# Patient Record
Sex: FEMALE | Race: WHITE | NOT HISPANIC OR LATINO | Employment: PART TIME | URBAN - METROPOLITAN AREA
[De-identification: names, ages, dates, MRNs, and addresses within clinical notes are randomized per-mention and may not be internally consistent; named-entity substitution may affect disease eponyms.]

---

## 2017-01-13 ENCOUNTER — TELEPHONE (OUTPATIENT)
Dept: FAMILY MEDICINE | Facility: OTHER | Age: 57
End: 2017-01-13

## 2017-01-13 NOTE — TELEPHONE ENCOUNTER
Patient declined setting up a appt, she has no insurance at this time. She thinks she will be getting it again soon.

## 2017-02-17 ENCOUNTER — TELEPHONE (OUTPATIENT)
Dept: FAMILY MEDICINE | Facility: OTHER | Age: 57
End: 2017-02-17

## 2017-02-17 NOTE — TELEPHONE ENCOUNTER
Summary:    Patient is due/failing the following:   COLONOSCOPY and MAMMOGRAM  Please inform patient of the [facility] program   Action needed:   Schedule a colonoscopy and a mammogram     Type of outreach:    Phone, left message for patient to call back.     Questions for provider review:    None                                                                                                                                    Ambika Yen       Chart routed to Care Team .      Panel Management Review      Patient has the following on her problem list: None      Composite cancer screening  Chart review shows that this patient is due/due soon for the following Mammogram and Colonoscopy

## 2017-02-17 NOTE — LETTER
[facility]  [address]   [facility] [address]  Phone: [phone]  February 24, 2017      July Green  [address]   [place] [facility] [place] [address]      Dear July,    We care about your health and have reviewed your health plan including your medical conditions, medications, and lab results.  Based on this review, it is recommended that you follow up regarding the following health topic(s):  -Breast Cancer Screening  -Colon Cancer Screening    We recommend you take the following action(s):  -schedule a MAMMOGRAM which is due. Please disregard this reminder if you have had this exam elsewhere within the last 1-2 years please let us know so we can update your records.  -schedule a COLONOSCOPY to look for colon cancer (due every 10 years or 5 years in higher risk situations.)  Colonoscopies can prevent 90-95% of colon cancer deaths.  Problem lesions can be removed before they ever become cancer.  If you do not wish to do a colonoscopy or cannot afford to do one at this time, there is another option called a Fecal Immunochemical Occult Blood Test (FIT) a take home stool sample kit.  It does not replace the colonoscopy for colorectal cancer screening, but it can detect hidden bleeding in the lower colon.  It does need to be repeated every year and if a positive result is obtained, you would be referred for a colonoscopy.  If you have completed either one of these tests at another facility, please have the records sent to our clinic for our records.     Please call us at the [facility] - [phone] (or use [employer]) to address the above recommendations.     Thank you for trusting [facility] and we appreciate the opportunity to serve you.  We look forward to supporting your healthcare needs in the future.    Healthy Regards,    Your Health Care Team  [facility]

## 2017-03-30 ENCOUNTER — TELEPHONE (OUTPATIENT)
Dept: UROLOGY | Facility: OTHER | Age: 57
End: 2017-03-30

## 2017-04-11 NOTE — TELEPHONE ENCOUNTER
Per Dr. Tirado, patient needs cystocele repair with mesh and pubovaginal sling.  Latonia Griffith, CMA

## 2017-04-11 NOTE — TELEPHONE ENCOUNTER
Patient advised of surgical date/time/place. Surgical packet mailed with detailed instructions.  Latonia Griffith CMA

## 2017-04-11 NOTE — TELEPHONE ENCOUNTER
Surgery scheduled at [facility] on 5/4/2017 at 1:00pm. Arrive at 12:00noon. Patient need to arrange H & P with primary, stay NPO after midnight night before, stop OTC blood thinners one week prior, and arrange a  for same day surgery. I left a message for patient to call.  Precert sent to managed care.  Latonia Griffith CMA
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
## 2017-04-20 NOTE — PATIENT INSTRUCTIONS
Before Your Surgery      Call your surgeon if there is any change in your health. This includes signs of a cold or flu (such as a sore throat, runny nose, cough, rash or fever).    Do not smoke, drink alcohol or take over the counter medicine (unless your surgeon or primary care doctor tells you to) for the 24 hours before and after surgery.    If you take prescribed drugs: Follow your doctor s orders about which medicines to take and which to stop until after surgery. Avoid aspirin, ibuprofen, and Aleve until after surgery.    Eating and drinking prior to surgery: follow the instructions from your surgeon    Take a shower or bath the night before surgery. Use the soap your surgeon gave you to gently clean your skin. If you do not have soap from your surgeon, use your regular soap. Do not shave or scrub the surgery site.  Wear clean pajamas and have clean sheets on your bed.     Will start you on Lexapro to take daily to help with the night sweats.  Call me in 1 month to let me know how things are going and we can always increase the dose.      The chest pain is due to a muscle/cartilage strain. Avoid excessive bending and twisting and heavy lifting and use Tylenol/ibuprofen, ice/heat as needed. If symptoms are not improving, let me know.    Schedule your mammogram and colonoscopy.       Chest Wall Pain: Costochondritis    The chest pain that you have had today is caused by costochondritis. This condition is caused by an inflammation of the cartilage joining your ribs to your breastbone. It is not caused by heart or lung problems. The inflammation may have been brought on by a blow to the chest, lifting heavy objects, intense exercise, or an illness that made you cough and sneeze. It often occurs during times of emotional stress. It can be painful, but it is not dangerous. It usually goes away in 1 to 2 weeks. But it may happen again. Rarely, a more serious condition may cause symptoms similar to costochondritis.  That s why it s important to watch for the warning signs listed below.  Home care  Follow these guidelines when caring for yourself at home:    If you feel that emotional stress is a cause of your condition, try to figure out the sources of that stress. It may not be obvious! Learn ways to deal with the stress in your life. This can include regular exercise, muscle relaxation, meditation, or simply taking time out for yourself. For more information about this, talk with your health care provider. Or go to your local library and look at books on  stress reduction.     You may use acetaminophen or ibuprofen to control pain, unless another pain medicine was prescribed. If you have liver disease or ever had a stomach ulcer, talk with your health care provider before using these medicines.    You can also help ease pain by using a hot, wet compress or heating pad. Use this with or without a medicated skin cream that helps relieves pain.    Do stretching exercise as advised by your provider.    Take any prescribed medicines as directed.  Follow-up care  Follow up with your health care provider, or as advised, if you do not start to get better in the next 2 days.  When to seek medical advice  Call your health care provider right away if any of these occur:    A change in the type of pain. Call if it feels different, becomes more serious, lasts longer, or spreads into your shoulder, arm, neck, jaw, or back.    Shortness of breath or pain gets worse when you breathe    Weakness, dizziness, or fainting    Cough with dark-colored sputum (phlegm) or blood    Abdominal pain    Dark red or black stools    Fever of 100.4 F (38 C) or higher, or as directed by your health care provider    6937-1578 [employer] [address] [place] [address], [place] [address]. All rights reserved. This information is not intended as a substitute for professional medical care. Always follow your healthcare professional's instructions.

## 2017-04-26 ENCOUNTER — OFFICE VISIT (OUTPATIENT)
Dept: FAMILY MEDICINE | Facility: OTHER | Age: 57
End: 2017-04-26
Payer: COMMERCIAL

## 2017-04-26 VITALS
TEMPERATURE: 97.3 F | RESPIRATION RATE: 20 BRPM | SYSTOLIC BLOOD PRESSURE: 122 MMHG | HEART RATE: 78 BPM | BODY MASS INDEX: 27.66 KG/M2 | OXYGEN SATURATION: 96 % | HEIGHT: 64 IN | DIASTOLIC BLOOD PRESSURE: 80 MMHG | WEIGHT: 162 LBS

## 2017-04-26 DIAGNOSIS — N95.1 MENOPAUSAL SYNDROME (HOT FLASHES): ICD-10-CM

## 2017-04-26 DIAGNOSIS — R25.2 CRAMP OF LIMB: ICD-10-CM

## 2017-04-26 DIAGNOSIS — Z01.810 PRE-OPERATIVE CARDIOVASCULAR EXAMINATION: Primary | ICD-10-CM

## 2017-04-26 DIAGNOSIS — R07.89 COSTOCHONDRAL CHEST PAIN: ICD-10-CM

## 2017-04-26 DIAGNOSIS — Z01.818 PREOP GENERAL PHYSICAL EXAM: ICD-10-CM

## 2017-04-26 DIAGNOSIS — N81.10 BLADDER PROLAPSE, FEMALE, ACQUIRED: ICD-10-CM

## 2017-04-26 LAB
ANION GAP SERPL CALCULATED.3IONS-SCNC: 7 MMOL/L (ref 3–14)
BUN SERPL-MCNC: 10 MG/DL (ref 7–30)
CALCIUM SERPL-MCNC: 9.1 MG/DL (ref 8.5–10.1)
CHLORIDE SERPL-SCNC: 106 MMOL/L (ref 94–109)
CO2 SERPL-SCNC: 30 MMOL/L (ref 20–32)
CREAT SERPL-MCNC: 0.7 MG/DL (ref 0.52–1.04)
GFR SERPL CREATININE-BSD FRML MDRD: 86 ML/MIN/1.7M2
GLUCOSE SERPL-MCNC: 95 MG/DL (ref 70–99)
MAGNESIUM SERPL-MCNC: 2.4 MG/DL (ref 1.6–2.3)
POTASSIUM SERPL-SCNC: 4.1 MMOL/L (ref 3.4–5.3)
SODIUM SERPL-SCNC: 143 MMOL/L (ref 133–144)

## 2017-04-26 PROCEDURE — 83735 ASSAY OF MAGNESIUM: CPT | Performed by: PHYSICIAN ASSISTANT

## 2017-04-26 PROCEDURE — 36415 COLL VENOUS BLD VENIPUNCTURE: CPT | Performed by: PHYSICIAN ASSISTANT

## 2017-04-26 PROCEDURE — 80048 BASIC METABOLIC PNL TOTAL CA: CPT | Performed by: PHYSICIAN ASSISTANT

## 2017-04-26 PROCEDURE — 93000 ELECTROCARDIOGRAM COMPLETE: CPT | Performed by: PHYSICIAN ASSISTANT

## 2017-04-26 PROCEDURE — 99214 OFFICE O/P EST MOD 30 MIN: CPT | Performed by: PHYSICIAN ASSISTANT

## 2017-04-26 RX ORDER — ESCITALOPRAM OXALATE 10 MG/1
10 TABLET ORAL DAILY
Qty: 30 TABLET | Refills: 1 | Status: SHIPPED | OUTPATIENT
Start: 2017-04-26 | End: 2017-07-18

## 2017-04-26 NOTE — NURSING NOTE
"Chief Complaint   Patient presents with     Pre-Op Exam     dos- 5/4/17- [facility]     Panel Management     mammogram, colon screen, honoring choices        Initial /80 (BP Location: Right arm, Patient Position: Chair, Cuff Size: Adult Regular)  Pulse 78  Temp 97.3  F (36.3  C) (Temporal)  Resp 20  Ht 5' 4.25\" (1.632 m)  Wt 162 lb (73.5 kg)  LMP 05/12/2011  SpO2 96%  BMI 27.59 kg/m2 Estimated body mass index is 27.59 kg/(m^2) as calculated from the following:    Height as of this encounter: 5' 4.25\" (1.632 m).    Weight as of this encounter: 162 lb (73.5 kg).  Medication Reconciliation: complete     Cynthia Ramos, BRIDGETTE      "

## 2017-04-26 NOTE — MR AVS SNAPSHOT
After Visit Summary   4/26/2017    July Green    MRN: [id number]           Patient Information     Date Of Birth          1960        Visit Information        Provider Department      4/26/2017 7:30 AM Allan Peña PA-C [facility]        Today's Diagnoses     Pre-operative cardiovascular examination    -  1    Preop general physical exam        Bladder prolapse, female, acquired        Costochondral chest pain        Cramp of limb        Menopausal syndrome (hot flashes)          Care Instructions      Before Your Surgery      Call your surgeon if there is any change in your health. This includes signs of a cold or flu (such as a sore throat, runny nose, cough, rash or fever).    Do not smoke, drink alcohol or take over the counter medicine (unless your surgeon or primary care doctor tells you to) for the 24 hours before and after surgery.    If you take prescribed drugs: Follow your doctor s orders about which medicines to take and which to stop until after surgery. Avoid aspirin, ibuprofen, and Aleve until after surgery.    Eating and drinking prior to surgery: follow the instructions from your surgeon    Take a shower or bath the night before surgery. Use the soap your surgeon gave you to gently clean your skin. If you do not have soap from your surgeon, use your regular soap. Do not shave or scrub the surgery site.  Wear clean pajamas and have clean sheets on your bed.     Will start you on Lexapro to take daily to help with the night sweats.  Call me in 1 month to let me know how things are going and we can always increase the dose.      The chest pain is due to a muscle/cartilage strain. Avoid excessive bending and twisting and heavy lifting and use Tylenol/ibuprofen, ice/heat as needed. If symptoms are not improving, let me know.    Schedule your mammogram and colonoscopy.       Chest Wall Pain: Costochondritis    The chest pain that you have had today is  caused by costochondritis. This condition is caused by an inflammation of the cartilage joining your ribs to your breastbone. It is not caused by heart or lung problems. The inflammation may have been brought on by a blow to the chest, lifting heavy objects, intense exercise, or an illness that made you cough and sneeze. It often occurs during times of emotional stress. It can be painful, but it is not dangerous. It usually goes away in 1 to 2 weeks. But it may happen again. Rarely, a more serious condition may cause symptoms similar to costochondritis. That s why it s important to watch for the warning signs listed below.  Home care  Follow these guidelines when caring for yourself at home:    If you feel that emotional stress is a cause of your condition, try to figure out the sources of that stress. It may not be obvious! Learn ways to deal with the stress in your life. This can include regular exercise, muscle relaxation, meditation, or simply taking time out for yourself. For more information about this, talk with your health care provider. Or go to your local library and look at books on  stress reduction.     You may use acetaminophen or ibuprofen to control pain, unless another pain medicine was prescribed. If you have liver disease or ever had a stomach ulcer, talk with your health care provider before using these medicines.    You can also help ease pain by using a hot, wet compress or heating pad. Use this with or without a medicated skin cream that helps relieves pain.    Do stretching exercise as advised by your provider.    Take any prescribed medicines as directed.  Follow-up care  Follow up with your health care provider, or as advised, if you do not start to get better in the next 2 days.  When to seek medical advice  Call your health care provider right away if any of these occur:    A change in the type of pain. Call if it feels different, becomes more serious, lasts longer, or spreads into your  "shoulder, arm, neck, jaw, or back.    Shortness of breath or pain gets worse when you breathe    Weakness, dizziness, or fainting    Cough with dark-colored sputum (phlegm) or blood    Abdominal pain    Dark red or black stools    Fever of 100.4 F (38 C) or higher, or as directed by your health care provider    0956-6275 [employer]. [address] [place] [address]. All rights reserved. This information is not intended as a substitute for professional medical care. Always follow your healthcare professional's instructions.              Follow-ups after your visit        Your next 10 appointments already scheduled     May 04, 2017   Procedure with Yayo Tirado MD   [facility] [place] [facility] (--)    [address]  [facility] [address] [phone]              Who to contact     If you have questions or need follow up information about today's clinic visit or your schedule please contact [facility] directly at [phone].  Normal or non-critical lab and imaging results will be communicated to you by [employer], letter or phone within 4 business days after the clinic has received the results. If you do not hear from us within 7 days, please contact the clinic through MyChart or phone. If you have a critical or abnormal lab result, we will notify you by phone as soon as possible.  Submit refill requests through [employer] or call your pharmacy and they will forward the refill request to us. Please allow 3 business days for your refill to be completed.          Additional Information About Your Visit        [employer] Information     [employer] lets you send messages to your doctor, view your test results, renew your prescriptions, schedule appointments and more. To sign up, go to [email or web address] . Click on \"Log in\" on the left side of the screen, which will take you to the Welcome page. Then click on \"Sign up Now\" on the right side of the page.     You will be " "asked to enter the access code listed below, as well as some personal information. Please follow the directions to create your username and password.     Your access code is: [email or web address]  Expires: 2017  8:09 AM     Your access code will  in 90 days. If you need help or a new code, please call your [facility] clinic or [phone].        Care EveryWhere ID     This is your Care EveryWhere ID. This could be used by other organizations to access your [facility] medical records  [id number]        Your Vitals Were     Pulse Temperature Respirations Height Last Period Pulse Oximetry    78 97.3  F (36.3  C) (Temporal) 20 5' 4.25\" (1.632 m) 2011 96%    BMI (Body Mass Index)                   27.59 kg/m2            Blood Pressure from Last 3 Encounters:   17 122/80   16 138/68   10/26/16 100/60    Weight from Last 3 Encounters:   17 162 lb (73.5 kg)   10/26/16 162 lb (73.5 kg)   10/14/16 162 lb (73.5 kg)              We Performed the Following     Basic metabolic panel  (Ca, Cl, CO2, Creat, Gluc, K, Na, BUN)     EKG 12-lead complete w/read - Clinics     Magnesium          Today's Medication Changes          These changes are accurate as of: 17  8:09 AM.  If you have any questions, ask your nurse or doctor.               Start taking these medicines.        Dose/Directions    escitalopram 10 MG tablet   Commonly known as:  LEXAPRO   Used for:  Menopausal syndrome (hot flashes)   Started by:  Allan Peña PA-C        Dose:  10 mg   Take 1 tablet (10 mg) by mouth daily   Quantity:  30 tablet   Refills:  1         Stop taking these medicines if you haven't already. Please contact your care team if you have questions.     venlafaxine 37.5 MG 24 hr capsule   Commonly known as:  EFFEXOR-XR   Stopped by:  Allan Peña PA-C                Where to get your medicines      These medications [place] [facility] [place] [address]  [address], [facility] [address]     Phone:  [phone]     escitalopram 10 MG tablet                Primary Care Provider Office Phone #    [facility] [phone]       No address on file        Thank you!     Thank you for choosing [facility]  for your care. Our goal is always to provide you with excellent care. Hearing back from our patients is one way we can continue to improve our services. Please take a few minutes to complete the written survey that you may receive in the mail after your visit with us. Thank you!             Your Updated Medication List - Protect others around you: Learn how to safely use, store and throw away your medicines at www.disposemymeds.org.          This list is accurate as of: 4/26/17  8:09 AM.  Always use your most recent med list.                   Brand Name Dispense Instructions for use    escitalopram 10 MG tablet    LEXAPRO    30 tablet    Take 1 tablet (10 mg) by mouth daily       MULTIVITAMIN ADULT PO          * triamcinolone 0.5 % cream    KENALOG    30 g    Apply sparingly to affected area three times daily.       * triamcinolone 0.1 % cream    KENALOG    80 g    Apply sparingly to affected area three times daily for 14 days at a time.       * Notice:  This list has 2 medication(s) that are the same as other medications prescribed for you. Read the directions carefully, and ask your doctor or other care provider to review them with you.

## 2017-05-03 ENCOUNTER — ANESTHESIA EVENT (OUTPATIENT)
Dept: SURGERY | Facility: AMBULATORY SURGERY CENTER | Age: 57
End: 2017-05-03

## 2017-05-04 ENCOUNTER — ANESTHESIA (OUTPATIENT)
Dept: SURGERY | Facility: AMBULATORY SURGERY CENTER | Age: 57
End: 2017-05-04

## 2017-05-04 ENCOUNTER — HOSPITAL ENCOUNTER (OUTPATIENT)
Facility: AMBULATORY SURGERY CENTER | Age: 57
Discharge: HOME | End: 2017-05-04
Attending: UROLOGY | Admitting: UROLOGY
Payer: COMMERCIAL

## 2017-05-04 VITALS
OXYGEN SATURATION: 94 % | SYSTOLIC BLOOD PRESSURE: 121 MMHG | RESPIRATION RATE: 16 BRPM | DIASTOLIC BLOOD PRESSURE: 72 MMHG | TEMPERATURE: 97.2 F

## 2017-05-04 DIAGNOSIS — Z98.890 POST-OPERATIVE STATE: Primary | ICD-10-CM

## 2017-05-04 PROCEDURE — 57282 COLPOPEXY EXTRAPERITONEAL: CPT | Performed by: UROLOGY

## 2017-05-04 PROCEDURE — 57267 INSERT MESH/PELVIC FLR ADDON: CPT | Mod: XS

## 2017-05-04 PROCEDURE — G8918 PT W/O PREOP ORDER IV AB PRO: HCPCS

## 2017-05-04 PROCEDURE — 57267 INSERT MESH/PELVIC FLR ADDON: CPT | Performed by: UROLOGY

## 2017-05-04 PROCEDURE — 57240 ANTERIOR COLPORRHAPHY: CPT | Performed by: UROLOGY

## 2017-05-04 PROCEDURE — 57240 ANTERIOR COLPORRHAPHY: CPT

## 2017-05-04 PROCEDURE — 57288 REPAIR BLADDER DEFECT: CPT

## 2017-05-04 PROCEDURE — 57282 COLPOPEXY EXTRAPERITONEAL: CPT

## 2017-05-04 PROCEDURE — G8907 PT DOC NO EVENTS ON DISCHARG: HCPCS

## 2017-05-04 PROCEDURE — 57288 REPAIR BLADDER DEFECT: CPT | Performed by: UROLOGY

## 2017-05-04 DEVICE — MESH SLING OBTRYX-HALO M0068505000: Type: IMPLANTABLE DEVICE | Site: VAGINA | Status: FUNCTIONAL

## 2017-05-04 DEVICE — IMPLANTABLE DEVICE: Type: IMPLANTABLE DEVICE | Site: VAGINA | Status: FUNCTIONAL

## 2017-05-04 RX ORDER — ONDANSETRON 4 MG/1
4 TABLET, ORALLY DISINTEGRATING ORAL EVERY 30 MIN PRN
Status: DISCONTINUED | OUTPATIENT
Start: 2017-05-04 | End: 2017-05-05 | Stop reason: HOSPADM

## 2017-05-04 RX ORDER — NALOXONE HYDROCHLORIDE 0.4 MG/ML
.1-.4 INJECTION, SOLUTION INTRAMUSCULAR; INTRAVENOUS; SUBCUTANEOUS
Status: DISCONTINUED | OUTPATIENT
Start: 2017-05-04 | End: 2017-05-05 | Stop reason: HOSPADM

## 2017-05-04 RX ORDER — TRAMADOL HYDROCHLORIDE 50 MG/1
50 TABLET ORAL EVERY 6 HOURS PRN
Qty: 30 TABLET | Refills: 0 | Status: SHIPPED | OUTPATIENT
Start: 2017-05-04 | End: 2018-05-08

## 2017-05-04 RX ORDER — BUPIVACAINE HYDROCHLORIDE 2.5 MG/ML
INJECTION, SOLUTION INFILTRATION; PERINEURAL PRN
Status: DISCONTINUED | OUTPATIENT
Start: 2017-05-04 | End: 2017-05-04 | Stop reason: HOSPADM

## 2017-05-04 RX ORDER — LIDOCAINE 40 MG/G
CREAM TOPICAL
Status: DISCONTINUED | OUTPATIENT
Start: 2017-05-04 | End: 2017-05-05 | Stop reason: HOSPADM

## 2017-05-04 RX ORDER — FENTANYL CITRATE 50 UG/ML
25-50 INJECTION, SOLUTION INTRAMUSCULAR; INTRAVENOUS
Status: DISCONTINUED | OUTPATIENT
Start: 2017-05-04 | End: 2017-05-05 | Stop reason: HOSPADM

## 2017-05-04 RX ORDER — PROPOFOL 10 MG/ML
INJECTION, EMULSION INTRAVENOUS PRN
Status: DISCONTINUED | OUTPATIENT
Start: 2017-05-04 | End: 2017-05-04

## 2017-05-04 RX ORDER — ACETAMINOPHEN 325 MG/1
975 TABLET ORAL ONCE
Status: COMPLETED | OUTPATIENT
Start: 2017-05-04 | End: 2017-05-04

## 2017-05-04 RX ORDER — CEPHALEXIN 500 MG/1
500 CAPSULE ORAL 2 TIMES DAILY
Qty: 4 CAPSULE | Refills: 0 | Status: SHIPPED | OUTPATIENT
Start: 2017-05-04 | End: 2017-05-06

## 2017-05-04 RX ORDER — CEFAZOLIN SODIUM 2 G/100ML
2 INJECTION, SOLUTION INTRAVENOUS
Status: COMPLETED | OUTPATIENT
Start: 2017-05-04 | End: 2017-05-04

## 2017-05-04 RX ORDER — LIDOCAINE HYDROCHLORIDE 20 MG/ML
INJECTION, SOLUTION INFILTRATION; PERINEURAL PRN
Status: DISCONTINUED | OUTPATIENT
Start: 2017-05-04 | End: 2017-05-04

## 2017-05-04 RX ORDER — ONDANSETRON 2 MG/ML
4 INJECTION INTRAMUSCULAR; INTRAVENOUS EVERY 30 MIN PRN
Status: DISCONTINUED | OUTPATIENT
Start: 2017-05-04 | End: 2017-05-05 | Stop reason: HOSPADM

## 2017-05-04 RX ORDER — KETOROLAC TROMETHAMINE 30 MG/ML
30 INJECTION, SOLUTION INTRAMUSCULAR; INTRAVENOUS EVERY 6 HOURS PRN
Status: DISCONTINUED | OUTPATIENT
Start: 2017-05-04 | End: 2017-05-05 | Stop reason: HOSPADM

## 2017-05-04 RX ORDER — PROPOFOL 10 MG/ML
INJECTION, EMULSION INTRAVENOUS CONTINUOUS PRN
Status: DISCONTINUED | OUTPATIENT
Start: 2017-05-04 | End: 2017-05-04

## 2017-05-04 RX ORDER — OXYCODONE HYDROCHLORIDE 5 MG/1
5 TABLET ORAL ONCE
Status: DISCONTINUED | OUTPATIENT
Start: 2017-05-04 | End: 2017-05-05 | Stop reason: HOSPADM

## 2017-05-04 RX ORDER — ONDANSETRON 2 MG/ML
INJECTION INTRAMUSCULAR; INTRAVENOUS PRN
Status: DISCONTINUED | OUTPATIENT
Start: 2017-05-04 | End: 2017-05-04

## 2017-05-04 RX ORDER — SODIUM CHLORIDE, SODIUM LACTATE, POTASSIUM CHLORIDE, CALCIUM CHLORIDE 600; 310; 30; 20 MG/100ML; MG/100ML; MG/100ML; MG/100ML
INJECTION, SOLUTION INTRAVENOUS CONTINUOUS
Status: DISCONTINUED | OUTPATIENT
Start: 2017-05-04 | End: 2017-05-05 | Stop reason: HOSPADM

## 2017-05-04 RX ORDER — DEXAMETHASONE SODIUM PHOSPHATE 4 MG/ML
INJECTION, SOLUTION INTRA-ARTICULAR; INTRALESIONAL; INTRAMUSCULAR; INTRAVENOUS; SOFT TISSUE PRN
Status: DISCONTINUED | OUTPATIENT
Start: 2017-05-04 | End: 2017-05-04

## 2017-05-04 RX ORDER — FENTANYL CITRATE 50 UG/ML
INJECTION, SOLUTION INTRAMUSCULAR; INTRAVENOUS PRN
Status: DISCONTINUED | OUTPATIENT
Start: 2017-05-04 | End: 2017-05-04

## 2017-05-04 RX ORDER — MEPERIDINE HYDROCHLORIDE 25 MG/ML
12.5 INJECTION INTRAMUSCULAR; INTRAVENOUS; SUBCUTANEOUS
Status: DISCONTINUED | OUTPATIENT
Start: 2017-05-04 | End: 2017-05-05 | Stop reason: HOSPADM

## 2017-05-04 RX ADMIN — ONDANSETRON 4 MG: 2 INJECTION INTRAMUSCULAR; INTRAVENOUS at 13:06

## 2017-05-04 RX ADMIN — SODIUM CHLORIDE, SODIUM LACTATE, POTASSIUM CHLORIDE, CALCIUM CHLORIDE: 600; 310; 30; 20 INJECTION, SOLUTION INTRAVENOUS at 11:22

## 2017-05-04 RX ADMIN — FENTANYL CITRATE 25 MCG: 50 INJECTION, SOLUTION INTRAMUSCULAR; INTRAVENOUS at 13:39

## 2017-05-04 RX ADMIN — CEFAZOLIN SODIUM 2 G: 2 INJECTION, SOLUTION INTRAVENOUS at 12:03

## 2017-05-04 RX ADMIN — FENTANYL CITRATE 25 MCG: 50 INJECTION, SOLUTION INTRAMUSCULAR; INTRAVENOUS at 11:57

## 2017-05-04 RX ADMIN — LIDOCAINE HYDROCHLORIDE 60 MG: 20 INJECTION, SOLUTION INFILTRATION; PERINEURAL at 11:57

## 2017-05-04 RX ADMIN — FENTANYL CITRATE 25 MCG: 50 INJECTION, SOLUTION INTRAMUSCULAR; INTRAVENOUS at 13:43

## 2017-05-04 RX ADMIN — ACETAMINOPHEN 975 MG: 325 TABLET ORAL at 11:17

## 2017-05-04 RX ADMIN — SODIUM CHLORIDE, SODIUM LACTATE, POTASSIUM CHLORIDE, CALCIUM CHLORIDE: 600; 310; 30; 20 INJECTION, SOLUTION INTRAVENOUS at 11:54

## 2017-05-04 RX ADMIN — PROPOFOL 200 MG: 10 INJECTION, EMULSION INTRAVENOUS at 11:57

## 2017-05-04 RX ADMIN — SODIUM CHLORIDE, SODIUM LACTATE, POTASSIUM CHLORIDE, CALCIUM CHLORIDE: 600; 310; 30; 20 INJECTION, SOLUTION INTRAVENOUS at 13:07

## 2017-05-04 RX ADMIN — KETOROLAC TROMETHAMINE 30 MG: 30 INJECTION, SOLUTION INTRAMUSCULAR; INTRAVENOUS at 13:32

## 2017-05-04 RX ADMIN — PROPOFOL 175 MCG/KG/MIN: 10 INJECTION, EMULSION INTRAVENOUS at 11:57

## 2017-05-04 RX ADMIN — DEXAMETHASONE SODIUM PHOSPHATE 8 MG: 4 INJECTION, SOLUTION INTRA-ARTICULAR; INTRALESIONAL; INTRAMUSCULAR; INTRAVENOUS; SOFT TISSUE at 12:03

## 2017-05-04 RX ADMIN — PROPOFOL 50 MG: 10 INJECTION, EMULSION INTRAVENOUS at 12:10

## 2017-05-04 NOTE — ANESTHESIA POSTPROCEDURE EVALUATION
Patient: July Green    Procedure(s):  cystoscopy with pubovaginal sling, cystocele repair with mesh; sacralspinous fixation - Wound Class: II-Clean Contaminated    Diagnosis:incontinence  Diagnosis Additional Information: No value filed.    Anesthesia Type:  General    Note:  Anesthesia Post Evaluation    Patient location during evaluation: bedside  Patient participation: Able to fully participate in evaluation  Level of consciousness: awake  Pain management: adequate  Airway patency: patent  Cardiovascular status: acceptable  Respiratory status: acceptable  Hydration status: acceptable  PONV: controlled     Anesthetic complications: None          Last vitals:  Vitals:    05/04/17 1330 05/04/17 1335 05/04/17 1400   BP: 128/87 132/90 (!) 130/100   Resp: 19 (!) 7 16   Temp:      SpO2: 99% 99% 99%         Electronically Signed By: Jonny Corrales MD, MD  May 4, 2017  2:03 PM

## 2017-05-04 NOTE — IP AVS SNAPSHOT
[facility]    [address] [place] [address]    Phone:  [phone]                                       After Visit Summary   5/4/2017    July Green    MRN: [id number]           After Visit Summary Signature Page     I have received my discharge instructions, and my questions have been answered. I have discussed any challenges I see with this plan with the nurse or doctor.    ..........................................................................................................................................  Patient/Patient Representative Signature      ..........................................................................................................................................  Patient Representative Print Name and Relationship to Patient    ..................................................               ................................................  Date                                            Time    ..........................................................................................................................................  Reviewed by Signature/Title    ...................................................              ..............................................  Date                                                            Time

## 2017-05-04 NOTE — BRIEF OP NOTE
Leonardo  Brief Operative Note    Pre-operative diagnosis: Vaginal prolapse/urinary incontinence   Post-operative diagnosis same   Procedure: Procedure(s):  CYSTOSCOPY, SLING TRANSVAGINAL  Cystocele repair with mesh  Modified sacralspinalous fixation   Surgeon: Yayo Tirado MD   Assistants(s): None   Anesthesia: General    Estimated blood loss: Minimal - 50 ml                   Specimens: None   Implants: None       Complications: None   Condition on discharge: Stable   Comments: See dictated operative report for full details

## 2017-05-04 NOTE — OR NURSING
Patient reported dizziness with percocet in past.  Knowing oxycodone is the same medication as percocet patient requested to try 1 tab for pain relief.

## 2017-05-04 NOTE — DISCHARGE INSTRUCTIONS
[facility]  Same-Day Surgery   Adult Discharge Orders & Instructions   For 24 hours after surgery  1. Get plenty of rest.  A responsible adult must stay with you for at least 24 hours after you leave the hospital.   2. Do not drive or use heavy equipment.  If you have weakness or tingling, don't drive or use heavy equipment until this feeling goes away.  3. Do not drink alcohol.  4. Avoid strenuous or risky activities.  Ask for help when climbing stairs.   5. You may feel lightheaded.  IF so, sit for a few minutes before standing.  Have someone help you get up.   6. If you have nausea (feel sick to your stomach): Drink only clear liquids such as apple juice, ginger ale, broth or 7-Up.  Rest may also help.  Be sure to drink enough fluids.  Move to a regular diet as you feel able.  7. You may have a slight fever. Call the doctor if your fever is over 100 F (37.7 C) (taken under the tongue) or lasts longer than 24 hours.  8. You may have a dry mouth, a sore throat, muscle aches or trouble sleeping.  These should go away after 24 hours.  9. Do not make important or legal decisions.   Call your doctor for any of the followin.  Signs of infection (fever, growing tenderness at the surgery site, a large amount of drainage or bleeding, severe pain, foul-smelling drainage, redness, swelling).    2. It has been over 8 to 10 hours since surgery and you are still not able to urinate (pass water).    3.  Headache for over 24 hours.      To contact a doctor call:  [phone]

## 2017-05-04 NOTE — OR NURSING
Vaginal packing removed by KENNEDY Agarwal as per Dr. Tirado's instructions.  Patient to Bathroom to attempt void.  Unable to void and oozing blood.  Dr. Tirado paged.  Instructed to replace packing and insert arias.  Evangelina ABDALLA RN inserted packing and arias.  Patient instructed to in arias care and instructed to call Dr. Tirado's office first thing in morning to set up removal of packing and catheter.  Patient discharged to home.

## 2017-05-04 NOTE — ANESTHESIA CARE TRANSFER NOTE
Patient: July Green    Procedure(s):  cystoscopy with pubovaginal sling, cystocele repair with mesh; sacralspinous fixation - Wound Class: II-Clean Contaminated    Diagnosis: incontinence  Diagnosis Additional Information: No value filed.    Anesthesia Type:   General     Note:    Patient transferred to:PACU  Comments: To PACU, awake, comfortable, sats 100%, RA, Report to RN.      Vitals: (Last set prior to Anesthesia Care Transfer)    CRNA VITALS  5/4/2017 1247 - 5/4/2017 1321      5/4/2017             Pulse: 60    SpO2: 99 %    Resp Rate (observed): (!)  6                Electronically Signed By: RODRICK Casas CRNA  May 4, 2017  1:21 PM

## 2017-05-04 NOTE — ANESTHESIA PREPROCEDURE EVALUATION
Anesthesia Evaluation     . Pt has had prior anesthetic. Type: General    No history of anesthetic complications          ROS/MED HX    ENT/Pulmonary:  - neg pulmonary ROS     Neurologic:  - neg neurologic ROS     Cardiovascular:  - neg cardiovascular ROS       METS/Exercise Tolerance:  >4 METS   Hematologic:  - neg hematologic  ROS       Musculoskeletal:  - neg musculoskeletal ROS       GI/Hepatic:     (+) GERD Asymptomatic on medication,       Renal/Genitourinary:  - ROS Renal section negative       Endo:  - neg endo ROS       Psychiatric:  - neg psychiatric ROS       Infectious Disease:  - neg infectious disease ROS       Malignancy:         Other:                     Physical Exam      Airway   Mallampati: I  TM distance: >3 FB  Neck ROM: full    Dental   (+) upper dentures    Cardiovascular   Rhythm and rate: regular and normal      Pulmonary    breath sounds clear to auscultation                    Anesthesia Plan      History & Physical Review  History and physical reviewed and following examination; no interval change.    ASA Status:  1 .    NPO Status:  > 6 hours    Plan for MAC with Intravenous induction. Maintenance will be TIVA.  Reason for MAC:  Deep or markedly invasive procedure (G8)  PONV prophylaxis:  Ondansetron (or other 5HT-3) and Dexamethasone or Solumedrol       Postoperative Care  Postoperative pain management:  Oral pain medications and Multi-modal analgesia.      Consents  Anesthetic plan, risks, benefits and alternatives discussed with:  Patient..                          .

## 2017-05-04 NOTE — IP AVS SNAPSHOT
MRN:[id number]                      After Visit Summary   5/4/2017    July Green    MRN: [id number]           Thank you!     Thank you for choosing [facility] for your care. Our goal is always to provide you with excellent care. Hearing back from our patients is one way we can continue to improve our services. Please take a few minutes to complete the written survey that you may receive in the mail after you visit with us. Thank you!        Patient Information     Date Of Birth          1960        About your hospital stay     You were admitted on:  May 4, 2017 You last received care in the:  [facility]    You were discharged on:  May 4, 2017       Who to Call     For medical emergencies, please call 911.  For non-urgent questions about your medical care, please call your primary care provider or clinic, [phone]  For questions related to your surgery, please call your surgery clinic        Attending Provider     Provider Yayo Bynum MD Urology       Primary Care Provider Office Phone #    [facility] [phone]       No address on file        After Care Instructions     Diet Instructions       Resume pre procedure diet            Discharge Instructions       Patient to arrange for follow up appointment in 4 weeks            Encourage fluids       Encourage fluids at home to keep urine clear to light pink            No Alcohol       for 24 hours post procedure            No driving or operating machinery        until the day after procedure            No lifting over 15 pounds and no strenuous physical activity       for 2 weeks                  Further instructions from your care team       [facility]  Same-Day Surgery   Adult Discharge Orders & Instructions   For 24 hours after surgery  1. Get plenty of rest.  A responsible adult must stay with you for at least 24 hours after you leave the hospital.  "  2. Do not drive or use heavy equipment.  If you have weakness or tingling, don't drive or use heavy equipment until this feeling goes away.  3. Do not drink alcohol.  4. Avoid strenuous or risky activities.  Ask for help when climbing stairs.   5. You may feel lightheaded.  IF so, sit for a few minutes before standing.  Have someone help you get up.   6. If you have nausea (feel sick to your stomach): Drink only clear liquids such as apple juice, ginger ale, broth or 7-Up.  Rest may also help.  Be sure to drink enough fluids.  Move to a regular diet as you feel able.  7. You may have a slight fever. Call the doctor if your fever is over 100 F (37.7 C) (taken under the tongue) or lasts longer than 24 hours.  8. You may have a dry mouth, a sore throat, muscle aches or trouble sleeping.  These should go away after 24 hours.  9. Do not make important or legal decisions.   Call your doctor for any of the followin.  Signs of infection (fever, growing tenderness at the surgery site, a large amount of drainage or bleeding, severe pain, foul-smelling drainage, redness, swelling).    2. It has been over 8 to 10 hours since surgery and you are still not able to urinate (pass water).    3.  Headache for over 24 hours.      To contact a doctor call:  [phone]      Pending Results     No orders found from 2017 to 2017.            Admission Information     Date & Time Provider Department Dept. Phone    2017 Yayo Tirado MD [facility] [phone]      Your Vitals Were     Blood Pressure Temperature Respirations Last Period Pulse Oximetry       132/90 97.2  F (36.2  C) (Temporal) 7 2011 99%       MyChart Information     [employer] lets you send messages to your doctor, view your test results, renew your prescriptions, schedule appointments and more. To sign up, go to [email or web address] . Click on \"Log in\" on the left side of the screen, which will take you to the Welcome " "page. Then click on \"Sign up Now\" on the right side of the page.     You will be asked to enter the access code listed below, as well as some personal information. Please follow the directions to create your username and password.     Your access code is: [email or web address]  Expires: 2017  8:09 AM     Your access code will  in 90 days. If you need help or a new code, please call your [facility] clinic or [phone].        Care EveryWhere ID     This is your Care EveryWhere ID. This could be used by other organizations to access your [facility] medical records  [id number]           Review of your medicines      UNREVIEWED medicines. Ask your doctor about these medicines        Dose / Directions    escitalopram 10 MG tablet   Commonly known as:  LEXAPRO   Used for:  Menopausal syndrome (hot flashes)        Dose:  10 mg   Take 1 tablet (10 mg) by mouth daily   Quantity:  30 tablet   Refills:  1       MULTIVITAMIN ADULT PO        Refills:  0       * triamcinolone 0.5 % cream   Commonly known as:  KENALOG   Used for:  Eczema of both hands        Apply sparingly to affected area three times daily.   Quantity:  30 g   Refills:  1       * triamcinolone 0.1 % cream   Commonly known as:  KENALOG   Used for:  Eczema, unspecified type        Apply sparingly to affected area three times daily for 14 days at a time.   Quantity:  80 g   Refills:  2       * Notice:  This list has 2 medication(s) that are the same as other medications prescribed for you. Read the directions carefully, and ask your doctor or other care provider to review them with you.      START taking        Dose / Directions    cephALEXin 500 MG capsule   Commonly known as:  KEFLEX   Used for:  Post-operative state        Dose:  500 mg   Take 1 capsule (500 mg) by mouth 2 times daily for 2 days   Quantity:  4 capsule   Refills:  0       traMADol 50 MG tablet   Commonly known as:  ULTRAM   Used for:  Post-operative state        Dose:  50 mg   Take 1 tablet (50 " mg) by mouth every 6 hours as needed for pain   Quantity:  30 tablet   Refills:  0            Where to get your medicines      These medications were sent to [facility] [address] [place], [place] - [address] [address]  [address] [facility] [address]     Phone:  [phone]     cephALEXin 500 MG capsule         Some of these will need a paper prescription and others can be bought over the counter. Ask your nurse if you have questions.     Bring a paper prescription for each of these medications     traMADol 50 MG tablet                Protect others around you: Learn how to safely use, store and throw away your medicines at www.disposemymeds.org.             Medication List: This is a list of all your medications and when to take them. Check marks below indicate your daily home schedule. Keep this list as a reference.      Medications           Morning Afternoon Evening Bedtime As Needed    cephALEXin 500 MG capsule   Commonly known as:  KEFLEX   Take 1 capsule (500 mg) by mouth 2 times daily for 2 days                                escitalopram 10 MG tablet   Commonly known as:  LEXAPRO   Take 1 tablet (10 mg) by mouth daily                                MULTIVITAMIN ADULT PO                                traMADol 50 MG tablet   Commonly known as:  ULTRAM   Take 1 tablet (50 mg) by mouth every 6 hours as needed for pain                                * triamcinolone 0.5 % cream   Commonly known as:  KENALOG   Apply sparingly to affected area three times daily.                                * triamcinolone 0.1 % cream   Commonly known as:  KENALOG   Apply sparingly to affected area three times daily for 14 days at a time.                                * Notice:  This list has 2 medication(s) that are the same as other medications prescribed for you. Read the directions carefully, and ask your doctor or other care provider to review them with you.

## 2017-05-05 ENCOUNTER — OFFICE VISIT (OUTPATIENT)
Dept: UROLOGY | Facility: CLINIC | Age: 57
End: 2017-05-05
Payer: COMMERCIAL

## 2017-05-05 ENCOUNTER — TELEPHONE (OUTPATIENT)
Dept: UROLOGY | Facility: CLINIC | Age: 57
End: 2017-05-05

## 2017-05-05 VITALS — SYSTOLIC BLOOD PRESSURE: 99 MMHG | DIASTOLIC BLOOD PRESSURE: 73 MMHG | RESPIRATION RATE: 12 BRPM | HEART RATE: 74 BPM

## 2017-05-05 DIAGNOSIS — Z98.890 POST-OPERATIVE STATE: Primary | ICD-10-CM

## 2017-05-05 PROCEDURE — 99024 POSTOP FOLLOW-UP VISIT: CPT | Performed by: UROLOGY

## 2017-05-05 RX ORDER — ONDANSETRON 4 MG/1
4 TABLET, FILM COATED ORAL EVERY 8 HOURS PRN
Qty: 18 TABLET | Refills: 0 | Status: SHIPPED | OUTPATIENT
Start: 2017-05-05 | End: 2017-05-25

## 2017-05-05 NOTE — TELEPHONE ENCOUNTER
Left message for patient advising she come to our [place] clinic today for a TOV per Dr. Tirado. Address and phone number provided.  Latonia Griffith, CMA

## 2017-05-05 NOTE — OP NOTE
DATE OF PROCEDURE:  05/04/2017      PREOPERATIVE DIAGNOSIS:  Vaginal prolapse with urinary incontinence.      POSTOPERATIVE DIAGNOSIS:  Vaginal prolapse with urinary incontinence.      PROCEDURES:   1.  Mid urethral sling.   2.  Cystocele repair with mesh.   3.  Modified sacrospinous fixation.      DESCRIPTION OF PROCEDURE:  July Green was brought to the operating room after which general anesthesia was induced, she was placed in dorsal lithotomy position.  She was draped and prepped in the usual surgical fashion.  Preoperative antibiotics were given.  Marcaine 0.25% was used as local anesthetic throughout the entire case.  A 16-Faroese Goins catheter was then placed into the bladder.  The urethra was identified and a small incision was made over it.  The vaginal mucosa was then dissected off from the urethral fascia bilaterally.  After this was done, 2 stab incisions were made at the level of the clitoris just at the medial aspect of the obturator canal.  Using the Obtryx device this was placed in the usual fashion through the obturator canal for the urethrovaginal incision.  The mesh was then laid flat underneath the urethra.  It was tensioned.  Excess mesh was then excised.  Vaginal mucosa was then closed using 2-0 Vicryl suture.  After this was done, I then paid attention to the vaginal prolapse.  A transverse incision was made at the level of the bladder neck over the anterior vaginal wall.  The vaginal mucosa was then dissected off from the vesical fascia bilaterally from the bladder neck toward the cervical cuff.  The retroperitoneal space was then entered on both sides using both sharp and blunt dissection.  The ischial spine and the sacrospinous ligament was identified in both sides.  I then proceeded to use the Uphold device to support the apex of the vagina also the cystocele portion.  Using the device supplied by the kit, the anchor was placed in the sacrospinous ligament 1 cm medial to the ischial  spine.  This was done on both sides.  The mesh was then laid flat underneath the bladder to support both the apex of the vagina and the cystocele.  The mesh was laid flat without any tension.  I placed several 2-0 Vicryl suture around the mesh to keep it from moving.  This was placed in the vaginal mucosa.  After the mesh has been tensioned and the area looks well supported the area was then irrigated and the vaginal mucosa was then closed using 2-0 Vicryl suture.  Cystoscopy was then performed that showed no evidence of bladder or urethral injury.  Vaginal pack was then placed.  The patient tolerated the procedure well.  There were no complications identified during the procedure.  There was minimal bleeding during the operation.         XIN REDMOND MD             D: 2017 13:25   T: 2017 21:18   MT: KELLEY#126      Name:     TYRELL LOONEY   MRN:      [id number]        Account:        [id number]   :      1960           Procedure Date: 2017      Document: [id number]
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
## 2017-05-05 NOTE — TELEPHONE ENCOUNTER
Reason for Call:  Other appointment    Detailed comments: Patient states she needs to come in to be seen today for a catheter and packing removal. Please call.    Phone Number Patient can be reached at: Home number on file [phone] (home)    Best Time: soon    Can we leave a detailed message on this number? Not Applicable    Call taken on 5/5/2017 at 7:59 AM by Alice Flores

## 2017-05-05 NOTE — PROGRESS NOTES
Patient arrives for trial of void.  200sterile water instilled into bladder through existing arias catheter. Balloon deflated and catheter removed without problem. Patient urinates 220 ml pink tinged urine into uripan. Patient to return to clinic or seek medical attention if not able to urinate.     Zenaida Dahl RN

## 2017-05-05 NOTE — MR AVS SNAPSHOT
"              After Visit Summary   2017    July Green    MRN: [id number]           Patient Information     Date Of Birth          1960        Visit Information        Provider Department      2017 9:45 AM Yayo Tirado MD [facility]        Today's Diagnoses     Post-operative state    -  1       Follow-ups after your visit        Who to contact     If you have questions or need follow up information about today's clinic visit or your schedule please contact [facility] directly at [phone].  Normal or non-critical lab and imaging results will be communicated to you by [employer], letter or phone within 4 business days after the clinic has received the results. If you do not hear from us within 7 days, please contact the clinic through [employer] or phone. If you have a critical or abnormal lab result, we will notify you by phone as soon as possible.  Submit refill requests through [employer] or call your pharmacy and they will forward the refill request to us. Please allow 3 business days for your refill to be completed.          Additional Information About Your Visit        MyChart Information     [employer] lets you send messages to your doctor, view your test results, renew your prescriptions, schedule appointments and more. To sign up, go to [email or web address] . Click on \"Log in\" on the left side of the screen, which will take you to the Welcome page. Then click on \"Sign up Now\" on the right side of the page.     You will be asked to enter the access code listed below, as well as some personal information. Please follow the directions to create your username and password.     Your access code is: -[email or web address]  Expires: 2017  8:09 AM     Your access code will  in 90 days. If you need help or a new code, please call your [facility] or [phone].        Care EveryWhere ID     This is your Care EveryWhere ID. This could be used by other " organizations to access your [facility] medical records  [id number]        Your Vitals Were     Pulse Respirations Last Period             74 12 05/12/2011          Blood Pressure from Last 3 Encounters:   05/05/17 99/73   05/04/17 121/72   04/26/17 122/80    Weight from Last 3 Encounters:   04/26/17 73.5 kg (162 lb)   10/26/16 73.5 kg (162 lb)   10/14/16 73.5 kg (162 lb)              We Performed the Following     IRRIGATION BLADDER SIMPLE LAVAGE/INSTILLATION          Today's Medication Changes          These changes are accurate as of: 5/5/17 10:15 AM.  If you have any questions, ask your nurse or doctor.               Start taking these medicines.        Dose/Directions    ondansetron 4 MG tablet   Commonly known as:  ZOFRAN   Used for:  Post-operative state   Started by:  Yayo Tirado MD        Dose:  4 mg   Take 1 tablet (4 mg) by mouth every 8 hours as needed for nausea   Quantity:  18 tablet   Refills:  0            Where to get your medicines      These medications were sent to [facility] [address] [place], [place] [address] [address] [address]     Phone:  [phone]     ondansetron 4 MG tablet                Primary Care Provider Office Phone #    [facility] [phone]       No address on file        Thank you!     Thank you for choosing [facility]  for your care. Our goal is always to provide you with excellent care. Hearing back from our patients is one way we can continue to improve our services. Please take a few minutes to complete the written survey that you may receive in the mail after your visit with us. Thank you!             Your Updated Medication List - Protect others around you: Learn how to safely use, store and throw away your medicines at www.disposemymeds.org.          This list is accurate as of: 5/5/17 10:15 AM.  Always use your most recent med list.                   Brand Name Dispense Instructions for use    cephALEXin  500 MG capsule    KEFLEX    4 capsule    Take 1 capsule (500 mg) by mouth 2 times daily for 2 days       escitalopram 10 MG tablet    LEXAPRO    30 tablet    Take 1 tablet (10 mg) by mouth daily       MULTIVITAMIN ADULT PO          ondansetron 4 MG tablet    ZOFRAN    18 tablet    Take 1 tablet (4 mg) by mouth every 8 hours as needed for nausea       traMADol 50 MG tablet    ULTRAM    30 tablet    Take 1 tablet (50 mg) by mouth every 6 hours as needed for pain       * triamcinolone 0.5 % cream    KENALOG    30 g    Apply sparingly to affected area three times daily.       * triamcinolone 0.1 % cream    KENALOG    80 g    Apply sparingly to affected area three times daily for 14 days at a time.       * Notice:  This list has 2 medication(s) that are the same as other medications prescribed for you. Read the directions carefully, and ask your doctor or other care provider to review them with you.

## 2017-05-08 ENCOUNTER — TELEPHONE (OUTPATIENT)
Dept: UROLOGY | Facility: CLINIC | Age: 57
End: 2017-05-08

## 2017-05-08 NOTE — TELEPHONE ENCOUNTER
Reason for Call:  Other call back    Detailed comments: Patient would like to speak with a nurse to discuss after surgery care.     Phone Number Patient can be reached at: Home number on file [phone] (home)    Best Time: any    Can we leave a detailed message on this number? YES    Call taken on 5/8/2017 at 9:31 AM by Carly Frederick

## 2017-05-08 NOTE — TELEPHONE ENCOUNTER
Called ans spoke to patient.  Patient states that she is having some burning on right labia. Patient denies, fever, chills, nausea or discharge.  Advised patient to make sure area is clean and to apply Vaseline to the area to create a barrier per Dr. Tirado.  Patient agrees with this plan.  Patient also made aware that sutures are in place and that can cause some vaginal discomfort as well. Zenaida Dahl RN

## 2017-05-25 ENCOUNTER — OFFICE VISIT (OUTPATIENT)
Dept: OBGYN | Facility: OTHER | Age: 57
End: 2017-05-25
Payer: COMMERCIAL

## 2017-05-25 ENCOUNTER — TELEPHONE (OUTPATIENT)
Dept: UROLOGY | Facility: CLINIC | Age: 57
End: 2017-05-25

## 2017-05-25 VITALS
BODY MASS INDEX: 27.8 KG/M2 | HEART RATE: 80 BPM | DIASTOLIC BLOOD PRESSURE: 64 MMHG | WEIGHT: 163.25 LBS | SYSTOLIC BLOOD PRESSURE: 110 MMHG

## 2017-05-25 DIAGNOSIS — N90.89 LABIAL LESION: Primary | ICD-10-CM

## 2017-05-25 PROCEDURE — 99213 OFFICE O/P EST LOW 20 MIN: CPT | Performed by: ADVANCED PRACTICE MIDWIFE

## 2017-05-25 NOTE — MR AVS SNAPSHOT
"              After Visit Summary   2017    July Green    MRN: [id number]           Patient Information     Date Of Birth          1960        Visit Information        Provider Department      2017 1:45 PM Paula Washington APRN CNM [facility] Anitra Dale        Today's Diagnoses     Labial lesion    -  1       Follow-ups after your visit        Who to contact     If you have questions or need follow up information about today's clinic visit or your schedule please contact [facility] directly at [phone].  Normal or non-critical lab and imaging results will be communicated to you by CityTherapyhart, letter or phone within 4 business days after the clinic has received the results. If you do not hear from us within 7 days, please contact the clinic through CityTherapyhart or phone. If you have a critical or abnormal lab result, we will notify you by phone as soon as possible.  Submit refill requests through Tradeos or call your pharmacy and they will forward the refill request to us. Please allow 3 business days for your refill to be completed.          Additional Information About Your Visit        MyChart Information     Tradeos lets you send messages to your doctor, view your test results, renew your prescriptions, schedule appointments and more. To sign up, go to [email or web address] . Click on \"Log in\" on the left side of the screen, which will take you to the Welcome page. Then click on \"Sign up Now\" on the right side of the page.     You will be asked to enter the access code listed below, as well as some personal information. Please follow the directions to create your username and password.     Your access code is: -[email or web address]  Expires: 2017  8:09 AM     Your access code will  in 90 days. If you need help or a new code, please call your [facility] or [phone].        Care EveryWhere ID     This is your Care EveryWhere ID. This could be used by other " organizations to access your [place] medical records  [id number]        Your Vitals Were     Pulse Last Period BMI (Body Mass Index)             80 05/12/2011 27.8 kg/m2          Blood Pressure from Last 3 Encounters:   05/25/17 110/64   05/05/17 99/73   05/04/17 121/72    Weight from Last 3 Encounters:   05/25/17 163 lb 4 oz (74 kg)   04/26/17 162 lb (73.5 kg)   10/26/16 162 lb (73.5 kg)              Today, you had the following     No orders found for display         Today's Medication Changes          These changes are accurate as of: 5/25/17  2:37 PM.  If you have any questions, ask your nurse or doctor.               Stop taking these medicines if you haven't already. Please contact your care team if you have questions.     ondansetron 4 MG tablet   Commonly known as:  ZOFRAN   Stopped by:  Paula Washington APRN CNM                    Primary Care Provider Office Phone #    [facility] [phone]       No address on file        Thank you!     Thank you for choosing [facility]  for your care. Our goal is always to provide you with excellent care. Hearing back from our patients is one way we can continue to improve our services. Please take a few minutes to complete the written survey that you may receive in the mail after your visit with us. Thank you!             Your Updated Medication List - Protect others around you: Learn how to safely use, store and throw away your medicines at www.disposemymeds.org.          This list is accurate as of: 5/25/17  2:37 PM.  Always use your most recent med list.                   Brand Name Dispense Instructions for use    escitalopram 10 MG tablet    LEXAPRO    30 tablet    Take 1 tablet (10 mg) by mouth daily       MULTIVITAMIN ADULT PO          traMADol 50 MG tablet    ULTRAM    30 tablet    Take 1 tablet (50 mg) by mouth every 6 hours as needed for pain       * triamcinolone 0.5 % cream    KENALOG    30 g    Apply sparingly to affected  area three times daily.       * triamcinolone 0.1 % cream    KENALOG    80 g    Apply sparingly to affected area three times daily for 14 days at a time.       * Notice:  This list has 2 medication(s) that are the same as other medications prescribed for you. Read the directions carefully, and ask your doctor or other care provider to review them with you.

## 2017-05-25 NOTE — NURSING NOTE
"Chief Complaint   Patient presents with     Vaginal Problem     vaginal burning       Initial /64 (BP Location: Right arm, Patient Position: Chair, Cuff Size: Adult Regular)  Pulse 80  Wt 163 lb 4 oz (74 kg)  LMP 05/12/2011  BMI 27.8 kg/m2 Estimated body mass index is 27.8 kg/(m^2) as calculated from the following:    Height as of 4/26/17: 5' 4.25\" (1.632 m).    Weight as of this encounter: 163 lb 4 oz (74 kg).  BP completed using cuff size: regular    No obstetric history on file.    The following HM Due: mammogram  colonoscopy/FIT      The following patient reported/Care Every where data was sent to:  P ABSTRACT QUALITY INITIATIVES [99440]       Pt will schedule   Leticia Thrasher CMA  .             "
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
## 2017-05-25 NOTE — TELEPHONE ENCOUNTER
Patient reports a sore on the left side of vagina fold not inside but on the skin part outside.  Patient states that is starting to drain.  Patient states that she feels that it is an ingrown hair.   Reassured patient that the sutures during surgery are internal with the vagina.    Patient denies any other symptoms.   Advised to apply heat to area.   Ok to apply a bacitracin ointment to the area.   Offered next day appointment, patient declined.     Patient agrees and will follow up in clinic if symptoms worsen.   Zenaida Dahl RN

## 2017-05-25 NOTE — TELEPHONE ENCOUNTER
Reason for call:  Patient reporting a symptom    Symptom or request: Burning around the incision site    Duration (how long have symptoms been present): 3 days    Have you been treated for this before? No    Additional comments: Patient states she is still experiencing a burning sensation around her incision site accompanied by white pus and believes this is infected. Please call to advise.    Phone Number patient can be reached at:  Home number on file [phone] (home)    Best Time:  any    Can we leave a detailed message on this number:  YES    Call taken on 5/25/2017 at 12:20 PM by Tony Worrell

## 2017-06-01 ENCOUNTER — OFFICE VISIT (OUTPATIENT)
Dept: FAMILY MEDICINE | Facility: OTHER | Age: 57
End: 2017-06-01
Payer: COMMERCIAL

## 2017-06-01 VITALS
DIASTOLIC BLOOD PRESSURE: 80 MMHG | SYSTOLIC BLOOD PRESSURE: 110 MMHG | RESPIRATION RATE: 16 BRPM | TEMPERATURE: 98.7 F | HEART RATE: 60 BPM

## 2017-06-01 DIAGNOSIS — J01.01 ACUTE RECURRENT MAXILLARY SINUSITIS: Primary | ICD-10-CM

## 2017-06-01 PROCEDURE — 99213 OFFICE O/P EST LOW 20 MIN: CPT | Performed by: PHYSICIAN ASSISTANT

## 2017-06-01 RX ORDER — CEFDINIR 300 MG/1
300 CAPSULE ORAL 2 TIMES DAILY
Qty: 20 CAPSULE | Refills: 0 | Status: SHIPPED | OUTPATIENT
Start: 2017-06-01 | End: 2017-07-26

## 2017-06-01 RX ORDER — FLUTICASONE PROPIONATE 50 MCG
1-2 SPRAY, SUSPENSION (ML) NASAL DAILY
Qty: 1 BOTTLE | Refills: 0 | Status: SHIPPED | OUTPATIENT
Start: 2017-06-01 | End: 2017-10-09

## 2017-06-01 NOTE — MR AVS SNAPSHOT
After Visit Summary   6/1/2017    July Green    MRN: [id number]           Patient Information     Date Of Birth          1960        Visit Information        Provider Department      6/1/2017 4:00 PM Allan Peña PA-C [facility]        Today's Diagnoses     Acute recurrent maxillary sinusitis    -  1      Care Instructions    Will prescribe an antibiotic called Omnicef to take twice daily for 10 days. Take a daily probiotic or Activia yogurt while you are on this.  Drink plenty of fluids.  Can use an over the counter Nettipot or sinus rinse to help with nasal congestion. Mucinex can also be helpful.   I also recommend Flonase nasal spray to help with nasal swelling.  Follow up if symptoms are not improving.            Follow-ups after your visit        Your next 10 appointments already scheduled     Jun 07, 2017  8:15 AM CDT   Return Visit with Yayo Tirado MD   [facility]    [address] [facility] [address] [phone]              Who to contact     If you have questions or need follow up information about today's clinic visit or your schedule please contact [facility] directly at [phone].  Normal or non-critical lab and imaging results will be communicated to you by MyChart, letter or phone within 4 business days after the clinic has received the results. If you do not hear from us within 7 days, please contact the clinic through Aer[employer] Mediahart or phone. If you have a critical or abnormal lab result, we will notify you by phone as soon as possible.  Submit refill requests through V[employer] Registry or call your pharmacy and they will forward the refill request to us. Please allow 3 business days for your refill to be completed.          Additional Information About Your Visit        Aerify MediaharSlinky Information     Vendor Registry lets you send messages to your doctor, view your test results, renew your  "prescriptions, schedule appointments and more. To sign up, go to [email or web address] . Click on \"Log in\" on the left side of the screen, which will take you to the Welcome page. Then click on \"Sign up Now\" on the right side of the page.     You will be asked to enter the access code listed below, as well as some personal information. Please follow the directions to create your username and password.     Your access code is: -[email or web address]  Expires: 2017  8:09 AM     Your access code will  in 90 days. If you need help or a new code, please call your [place] clinic or [phone].        Care EveryWhere ID     This is your Care EveryWhere ID. This could be used by other organizations to access your [place] medical records  [id number]        Your Vitals Were     Pulse Temperature Respirations Last Period          60 98.7  F (37.1  C) (Temporal) 16 2011         Blood Pressure from Last 3 Encounters:   17 110/80   17 110/64   17 99/73    Weight from Last 3 Encounters:   17 (P) 160 lb 9.6 oz (72.8 kg)   17 163 lb 4 oz (74 kg)   17 162 lb (73.5 kg)              Today, you had the following     No orders found for display         Today's Medication Changes          These changes are accurate as of: 17  4:16 PM.  If you have any questions, ask your nurse or doctor.               Start taking these medicines.        Dose/Directions    cefdinir 300 MG capsule   Commonly known as:  OMNICEF   Used for:  Acute recurrent maxillary sinusitis   Started by:  Allan Peña PA-C        Dose:  300 mg   Take 1 capsule (300 mg) by mouth 2 times daily   Quantity:  20 capsule   Refills:  0       fluticasone 50 MCG/ACT spray   Commonly known as:  FLONASE   Used for:  Acute recurrent maxillary sinusitis   Started by:  Allan Peña PA-C        Dose:  1-2 spray   Spray 1-2 sprays into both nostrils daily   Quantity:  1 Bottle   Refills:  0            Where to get " your medicines      [place] [facility] [place] [address]  [address], [place] [address]     Phone:  [phone]     cefdinir 300 MG capsule    fluticasone 50 MCG/ACT spray                Primary Care Provider Office Phone #    Leonardo Dale [phone]       No address on file        Thank you!     Thank you for choosing [facility]  for your care. Our goal is always to provide you with excellent care. Hearing back from our patients is one way we can continue to improve our services. Please take a few minutes to complete the written survey that you may receive in the mail after your visit with us. Thank you!             Your Updated Medication List - Protect others around you: Learn how to safely use, store and throw away your medicines at www.disposemymeds.org.          This list is accurate as of: 6/1/17  4:16 PM.  Always use your most recent med list.                   Brand Name Dispense Instructions for use    cefdinir 300 MG capsule    OMNICEF    20 capsule    Take 1 capsule (300 mg) by mouth 2 times daily       escitalopram 10 MG tablet    LEXAPRO    30 tablet    Take 1 tablet (10 mg) by mouth daily       fluticasone 50 MCG/ACT spray    FLONASE    1 Bottle    Spray 1-2 sprays into both nostrils daily       MULTIVITAMIN ADULT PO          traMADol 50 MG tablet    ULTRAM    30 tablet    Take 1 tablet (50 mg) by mouth every 6 hours as needed for pain       * triamcinolone 0.5 % cream    KENALOG    30 g    Apply sparingly to affected area three times daily.       * triamcinolone 0.1 % cream    KENALOG    80 g    Apply sparingly to affected area three times daily for 14 days at a time.       * Notice:  This list has 2 medication(s) that are the same as other medications prescribed for you. Read the directions carefully, and ask your doctor or other care provider to review them with you.

## 2017-06-01 NOTE — PATIENT INSTRUCTIONS
Will prescribe an antibiotic called Omnicef to take twice daily for 10 days. Take a daily probiotic or Activia yogurt while you are on this.  Drink plenty of fluids.  Can use an over the counter Nettipot or sinus rinse to help with nasal congestion. Mucinex can also be helpful.   I also recommend Flonase nasal spray to help with nasal swelling.  Follow up if symptoms are not improving.

## 2017-06-01 NOTE — PROGRESS NOTES
SUBJECTIVE:                                                    July Green is a 56 year old female who presents to clinic today for the following health issues:    HPI    Acute Illness   Acute illness concerns: sinus  Onset: 5 days    Fever: no    Chills/Sweats: no    Headache (location?): YES    Sinus Pressure:YES    Conjunctivitis:  YES: bilateral    Ear Pain: no    Rhinorrhea: YES- green discharge    Congestion: YES    Sore Throat: no     Cough: YES    Wheeze: no    Decreased Appetite: YES    Nausea: no    Vomiting: no    Diarrhea:  no    Dysuria/Freq.: no    Fatigue/Achiness: YES    Sick/Strep Exposure: no     Therapies Tried and outcome: zinc, nasal rinse-little release    Ears feel plugged, headache behind eyes, runny nose.     Problem list and histories reviewed & adjusted, as indicated.  Additional history: as documented    ROS:  Constitutional, HEENT, cardiovascular, pulmonary, gi and gu systems are negative, except as otherwise noted.    OBJECTIVE:                                                    /80  Pulse 60  Temp 98.7  F (37.1  C) (Temporal)  Resp 16  Wt (P) 160 lb 9.6 oz (72.8 kg)  LMP 05/12/2011  BMI (P) 27.35 kg/m2  Body mass index is 27.35 kg/(m^2) (pended).  GENERAL: healthy, alert and no distress  EYES: Eyes grossly normal to inspection, PERRL and conjunctivae and sclerae normal  HENT: ear canals and TM's normal, nose and mouth without ulcers or lesions. Nasal mucosa is erythematous and edematous.   NECK: no adenopathy, no asymmetry, masses, or scars and thyroid normal to palpation  RESP: lungs clear to auscultation - no rales, rhonchi or wheezes  CV: regular rate and rhythm, normal S1 S2, no S3 or S4, no murmur, click or rub       ASSESSMENT/PLAN:                                                        ICD-10-CM    1. Acute recurrent maxillary sinusitis J01.01 cefdinir (OMNICEF) 300 MG capsule     fluticasone (FLONASE) 50 MCG/ACT spray       Will prescribe Omnicef to take  twice daily for 10 days. Instructed to take a daily probiotic or Activia yogurt while on this.  Drink plenty of fluids.  Can use an over the counter Nettipot or sinus rinse to help with nasal congestion. Mucinex can also be helpful.   I also recommend Flonase nasal spray to help with nasal swelling.  Follow up if symptoms are not improving.    Lexapro seems to be helping her hot flashes.     Allan Peña PA-C  [employer]

## 2017-06-01 NOTE — NURSING NOTE
"Chief Complaint   Patient presents with     Sinus Problem       Initial /80  Pulse 60  Temp 98.7  F (37.1  C) (Temporal)  Resp 16  Wt (P) 160 lb 9.6 oz (72.8 kg)  LMP 05/12/2011  BMI (P) 27.35 kg/m2 Estimated body mass index is 27.35 kg/(m^2) (pended) as calculated from the following:    Height as of 4/26/17: 5' 4.25\" (1.632 m).    Weight as of this encounter: (P) 160 lb 9.6 oz (72.8 kg).  Medication Reconciliation: complete   Gladys Gonzalez CMA    "

## 2017-06-07 ENCOUNTER — OFFICE VISIT (OUTPATIENT)
Dept: UROLOGY | Facility: OTHER | Age: 57
End: 2017-06-07
Payer: COMMERCIAL

## 2017-06-07 VITALS
HEART RATE: 64 BPM | BODY MASS INDEX: 27.68 KG/M2 | DIASTOLIC BLOOD PRESSURE: 70 MMHG | RESPIRATION RATE: 16 BRPM | SYSTOLIC BLOOD PRESSURE: 106 MMHG | WEIGHT: 162.5 LBS

## 2017-06-07 DIAGNOSIS — N81.10 BLADDER PROLAPSE, FEMALE, ACQUIRED: Primary | ICD-10-CM

## 2017-06-07 PROCEDURE — 99024 POSTOP FOLLOW-UP VISIT: CPT | Performed by: UROLOGY
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
## 2017-06-07 NOTE — NURSING NOTE
"Chief Complaint   Patient presents with     Surgical Followup       Initial /70 (BP Location: Right arm, Patient Position: Chair, Cuff Size: Adult Regular)  Pulse 64  Resp 16  LMP 05/12/2011 Estimated body mass index is 27.35 kg/(m^2) (pended) as calculated from the following:    Height as of 4/26/17: 5' 4.25\" (1.632 m).    Weight as of 6/1/17: (P) 160 lb 9.6 oz (72.8 kg).  Medication Reconciliation: complete     Lina Skinner CMA      "

## 2017-06-07 NOTE — MR AVS SNAPSHOT
"              After Visit Summary   2017    July Green    MRN: [id number]           Patient Information     Date Of Birth          1960        Visit Information        Provider Department      2017 8:15 AM Yayo Tirado MD [facility]        Today's Diagnoses     Bladder prolapse, female, acquired    -  1      Care Instructions    Follow up in 3 months w/ Dr. Tirado; around September.          Follow-ups after your visit        Who to contact     If you have questions or need follow up information about today's clinic visit or your schedule please contact [facility] directly at [phone].  Normal or non-critical lab and imaging results will be communicated to you by Heavenly Foodshart, letter or phone within 4 business days after the clinic has received the results. If you do not hear from us within 7 days, please contact the clinic through Heavenly Foodshart or phone. If you have a critical or abnormal lab result, we will notify you by phone as soon as possible.  Submit refill requests through Entelos or call your pharmacy and they will forward the refill request to us. Please allow 3 business days for your refill to be completed.          Additional Information About Your Visit        MyChart Information     Entelos lets you send messages to your doctor, view your test results, renew your prescriptions, schedule appointments and more. To sign up, go to [email or web address] . Click on \"Log in\" on the left side of the screen, which will take you to the Welcome page. Then click on \"Sign up Now\" on the right side of the page.     You will be asked to enter the access code listed below, as well as some personal information. Please follow the directions to create your username and password.     Your access code is: [email or web address]  Expires: 2017  8:09 AM     Your access code will  in 90 days. If you need help or a new code, please call your [facility] or " [phone].        Care EveryWhere ID     This is your Care EveryWhere ID. This could be used by other organizations to access your [facility] medical records  [id number]        Your Vitals Were     Pulse Respirations Last Period BMI (Body Mass Index)          64 16 05/12/2011 27.68 kg/m2         Blood Pressure from Last 3 Encounters:   06/07/17 106/70   06/01/17 110/80   05/25/17 110/64    Weight from Last 3 Encounters:   06/07/17 162 lb 8 oz (73.7 kg)   06/01/17 (P) 160 lb 9.6 oz (72.8 kg)   05/25/17 163 lb 4 oz (74 kg)              Today, you had the following     No orders found for display       Primary Care Provider Office Phone #    [facility] [phone]       No address on file        Thank you!     Thank you for choosing [facility]  for your care. Our goal is always to provide you with excellent care. Hearing back from our patients is one way we can continue to improve our services. Please take a few minutes to complete the written survey that you may receive in the mail after your visit with us. Thank you!             Your Updated Medication List - Protect others around you: Learn how to safely use, store and throw away your medicines at www.disposemymeds.org.          This list is accurate as of: 6/7/17  8:27 AM.  Always use your most recent med list.                   Brand Name Dispense Instructions for use    cefdinir 300 MG capsule    OMNICEF    20 capsule    Take 1 capsule (300 mg) by mouth 2 times daily       escitalopram 10 MG tablet    LEXAPRO    30 tablet    Take 1 tablet (10 mg) by mouth daily       fluticasone 50 MCG/ACT spray    FLONASE    1 Bottle    Spray 1-2 sprays into both nostrils daily       MULTIVITAMIN ADULT PO          traMADol 50 MG tablet    ULTRAM    30 tablet    Take 1 tablet (50 mg) by mouth every 6 hours as needed for pain       * triamcinolone 0.5 % cream    KENALOG    30 g    Apply sparingly to affected area three times daily.       *  triamcinolone 0.1 % cream    KENALOG    80 g    Apply sparingly to affected area three times daily for 14 days at a time.       * Notice:  This list has 2 medication(s) that are the same as other medications prescribed for you. Read the directions carefully, and ask your doctor or other care provider to review them with you.

## 2017-06-27 ENCOUNTER — TELEPHONE (OUTPATIENT)
Dept: FAMILY MEDICINE | Facility: OTHER | Age: 57
End: 2017-06-27

## 2017-06-27 NOTE — TELEPHONE ENCOUNTER
"Patient presented to the clinic today with concerns of back pain.   She has a history of back pain and has been in a \"flare\" since Saturday.   She is walking, but she states she feels like she is walking funny and at a slower pace.   Rating pain 8/10.  Has been taking Tylenol Arthritis, which \"helps a little\".     RECOMMENDED DISPOSITION:  See in 24 hours - offered patient appointment for tomorrow, but this was declined. She will go to urgent care tonight or follow up tomorrow.   Will comply with recommendation: YES   If further questions/concerns or if Sx do not improve, worsen or new Sx develop, call your PCP or [facility] as soon as possible.    NOTES:  Disposition was determined by the first positive assessment question, therefore all previous assessment questions were negative.     Guideline used:  Telephone Triage Protocols for Nurses, Fourth Edition, Valery Hoffman, KENNEDY, BSN      "

## 2017-07-07 ENCOUNTER — TELEPHONE (OUTPATIENT)
Dept: FAMILY MEDICINE | Facility: OTHER | Age: 57
End: 2017-07-07

## 2017-07-07 NOTE — TELEPHONE ENCOUNTER
Panel Management Review      Patient has the following on her problem list: None      Composite cancer screening  Chart review shows that this patient is due/due soon for the following Mammogram and Colonoscopy  Summary:    Patient is due/failing the following:   MAMMOGRAM    Action needed:   Set up a time for a mammo and colonoscopy     Type of outreach:    Phone, left message for patient to call back.     Questions for provider review:    None                                                                                                                                    Jessica Zayas      Chart routed to Care Team .

## 2017-07-07 NOTE — LETTER
[facility]  [address]   [place] [address]  Phone: [phone]  July 12, 2017      July Green  [address]   [place] [address]      Dear July,    We care about your health and have reviewed your health plan including your medical conditions, medications, and lab results.  Based on this review, it is recommended that you follow up regarding the following health topic(s):  -Breast Cancer Screening  -Colon Cancer Screening    We recommend you take the following action(s):  -schedule a MAMMOGRAM which is due. Please disregard this reminder if you have had this exam elsewhere within the last 1-2 years please let us know so we can update your records.  -schedule a COLONOSCOPY to look for colon cancer (due every 10 years or 5 years in higher risk situations.)  Colonoscopies can prevent 90-95% of colon cancer deaths.  Problem lesions can be removed before they ever become cancer.  If you do not wish to do a colonoscopy or cannot afford to do one at this time, there is another option called a Fecal Immunochemical Occult Blood Test (FIT) a take home stool sample kit.  It does not replace the colonoscopy for colorectal cancer screening, but it can detect hidden bleeding in the lower colon.  It does need to be repeated every year and if a positive result is obtained, you would be referred for a colonoscopy.  If you have completed either one of these tests at another facility, please have the records sent to our clinic for our records.     Please call us at the [facility] - [phone] (or use [employer]) to address the above recommendations.     Thank you for trusting [facility] and we appreciate the opportunity to serve you.  We look forward to supporting your healthcare needs in the future.    Healthy Regards,    Your Health Care Team  [facility]

## 2017-07-18 DIAGNOSIS — N95.1 MENOPAUSAL SYNDROME (HOT FLASHES): ICD-10-CM

## 2017-07-19 NOTE — TELEPHONE ENCOUNTER
Escitalopram 10 mg     Last Written Prescription Date: 4/26/2017  Last Fill Quantity: 30, # refills: 1  Last Office Visit with G primary care provider:  6/1/2017        Last PHQ-9 score on record= No flowsheet data found.

## 2017-07-20 NOTE — TELEPHONE ENCOUNTER
"Routing refill request to provider for review/approval because:  Unable to approve with associated diagnosis of \"hot flashes\".    Christina Hoffman RN, BSN  "

## 2017-07-21 RX ORDER — ESCITALOPRAM OXALATE 10 MG/1
TABLET ORAL
Qty: 30 TABLET | Refills: 5 | Status: SHIPPED | OUTPATIENT
Start: 2017-07-21 | End: 2018-05-08

## 2017-07-26 ENCOUNTER — OFFICE VISIT (OUTPATIENT)
Dept: FAMILY MEDICINE | Facility: OTHER | Age: 57
End: 2017-07-26
Payer: COMMERCIAL

## 2017-07-26 ENCOUNTER — RADIANT APPOINTMENT (OUTPATIENT)
Dept: GENERAL RADIOLOGY | Facility: OTHER | Age: 57
End: 2017-07-26
Attending: PHYSICIAN ASSISTANT
Payer: COMMERCIAL

## 2017-07-26 VITALS
TEMPERATURE: 97.2 F | BODY MASS INDEX: 28.78 KG/M2 | WEIGHT: 169 LBS | DIASTOLIC BLOOD PRESSURE: 66 MMHG | SYSTOLIC BLOOD PRESSURE: 118 MMHG | OXYGEN SATURATION: 96 % | RESPIRATION RATE: 16 BRPM | HEART RATE: 63 BPM

## 2017-07-26 DIAGNOSIS — M54.16 LUMBAR BACK PAIN WITH RADICULOPATHY AFFECTING RIGHT LOWER EXTREMITY: Primary | ICD-10-CM

## 2017-07-26 DIAGNOSIS — R07.9 CHEST PAIN, UNSPECIFIED TYPE: ICD-10-CM

## 2017-07-26 DIAGNOSIS — M53.3 SACROILIAC JOINT PAIN: ICD-10-CM

## 2017-07-26 DIAGNOSIS — M54.16 LUMBAR BACK PAIN WITH RADICULOPATHY AFFECTING RIGHT LOWER EXTREMITY: ICD-10-CM

## 2017-07-26 PROCEDURE — 99214 OFFICE O/P EST MOD 30 MIN: CPT | Performed by: PHYSICIAN ASSISTANT

## 2017-07-26 PROCEDURE — 72100 X-RAY EXAM L-S SPINE 2/3 VWS: CPT

## 2017-07-26 RX ORDER — METHYLPREDNISOLONE 4 MG
TABLET, DOSE PACK ORAL
Qty: 21 TABLET | Refills: 0 | Status: SHIPPED | OUTPATIENT
Start: 2017-07-26 | End: 2017-09-07

## 2017-07-26 ASSESSMENT — PAIN SCALES - GENERAL: PAINLEVEL: NO PAIN (1)

## 2017-07-26 NOTE — PROGRESS NOTES
"  SUBJECTIVE:                                                    July Green is a 56 year old female who presents to clinic today for the following health issues:    HPI    Joint Pain    Onset: 1.5 weeks    Description:   Location: right hip  Character: \"pinching - like hip isn't moving enough\"    Intensity: 8/10 sometimes when moving    Progression of Symptoms: worse    Accompanying Signs & Symptoms:  Other symptoms: radiation of pain to leg    History:   Previous similar pain: no       Precipitating factors:   Trauma or overuse: no     Alleviating factors:  Improved by: rest/inactivity and laying down, sitting    Therapies Tried and outcome: tramadol which helps patient sleep      She has been experiencing a pinching pain in the right low back/posterior hip/buttock for the past 1.5 weeks with an aching pain down the anterior right thigh and occasionally into the ankle. She denies any numbness, tingling or weakness. She has been taking Aleve, Advil and tramadol which helps with the aching but not the \"pinching\" sensation. She takes Flexeril nightly as needed which helps her sleep. Pain is worse with standing and walking and when getting into a car. She has been using ice with minimal relief. She does states she has a history of degenerative discs in her low back. She has not tried any physical therapy or chiropractic treatment.    She also continues to describe occasional sharp, central chest pain that occurs randomly, maybe 1-2 times per month. It usually on lasts 1 minute or less and she denies radiation of the pain or associated shortness of breath or lightheadedness. It is not associated with activity. The pain has not increased in severity or frequency since her last visit.     Problem list and histories reviewed & adjusted, as indicated.  Additional history: none      ROS:  GENERAL: Denies fever, fatigue, weakness, weight gain, or weight loss.  CARDIOVASCULAR: +Intermittent chest pain. Denies shortness " of breath, irregular heartbeats, palpitations, or edema.  RESPIRATORY: Denies cough, hemoptysis, and shortness of breath.  GASTROINTESTINAL: Denies nausea, vomiting, change in appetite, abdominal pain, diarrhea, or constipation.  MUSCULOSKELETAL: +Right low back and buttock pain.   NEUROLOGIC: Denies headache, fainting, dizziness, memory loss, numbness, tingling, or seizures.    OBJECTIVE:     /66 (BP Location: Right arm, Patient Position: Chair, Cuff Size: Adult Regular)  Pulse 63  Temp 97.2  F (36.2  C) (Temporal)  Resp 16  Wt 169 lb (76.7 kg)  LMP 05/12/2011  SpO2 96%  BMI 28.78 kg/m2  Body mass index is 28.78 kg/(m^2).  GENERAL: healthy, alert and no distress  RESP: lungs clear to auscultation - no rales, rhonchi or wheezes  CV: regular rate and rhythm, normal S1 S2, no S3 or S4, no murmur, click or rub, no peripheral edema  MS: no gross musculoskeletal defects noted. There is minimal tenderness over the lumbar spinous processes with moderate tenderness over the bilateral SI joints, worse on the right, and over the right paraspinal musculature. Positive straight leg raise on the right and positive Jaison's on the right with pain elicited in the right lateral buttock. Negative SI compression testing.   NEURO: Normal strength and tone, mentation intact and speech normal. Cranial nerves II-XII are grossly intact. DTRs are 2+/4 throughout and symmetric. Gait is minimally antalgic.     Diagnostic Test Results:  XR Pending    ASSESSMENT/PLAN:       ICD-10-CM    1. Lumbar back pain with radiculopathy affecting right lower extremity M54.17 XR Lumbar Spine 2/3 Views     methylPREDNISolone (MEDROL DOSEPAK) 4 MG tablet     [facility] REFERRAL   2. Sacroiliac joint pain M53.3 XR Lumbar Spine 2/3 Views     methylPREDNISolone (MEDROL DOSEPAK) 4 MG tablet     [facility], [facility] CH[facility] REFERRAL   3. Chest pain, unspecified type R07.9          1-2. Her symptoms are consistent with SI joint  dysfunction along with lumbar radicular pain. I would like to start with an x-ray to evaluate the lumbar spine and SI joints.  Will prescribe a Medrol dose pack to use as directed.  Continue with Flexeril nightly as needed for muscle spasms and to help with sleep.  Continue with Aleve or Advil.  Can rotate between ice and heat.  Will send to physical therapy [facility] for further treatment.  Instructed to perform home stretching exercise.   If symptoms are not improving, she will let me know and we may need to order an MRI.     3. She had had intermittent, sharp central chest pain for over 1 year and had a normal EKG at her pre-op visit in April. The frequency and severity of her symptoms have not changed and the symptoms seem more musculoskeletal in nature rather than cardiac related. She will continue to monitor her symptoms closely and if they occur more frequently or starts to last longer with any new associated symptoms, then we can order further cardiac testing including a stress echo.       Allan Peña PA-C  [facility]

## 2017-07-26 NOTE — MR AVS SNAPSHOT
After Visit Summary   7/26/2017    July Green    MRN: [id number]           Patient Information     Date Of Birth          1960        Visit Information        Provider Department      7/26/2017 7:00 AM Allan Peña PA-C [facility]        Today's Diagnoses     Lumbar back pain with radiculopathy affecting right lower extremity    -  1    Sacroiliac joint pain        Chest pain, unspecified type          Care Instructions    Will order an x-ray of your back to check your spine and lower back joints.  Will prescribe a steroid pack to use as directed.  Continue with Flexeril nightly as needed for muscle spasms and to help you sleep.  Continue with Aleve or Advil.  You can rotate between ice and heat.  Will send you to physical therapy for further treatment.  Perform home stretching exercise.   If symptoms are not improving, let me know.    If you chest pain occurs more frequently or starts to last longer, we need to look into a stress echocardiogram.             Follow-ups after your visit        Additional Services     [facility], [facility]RACTIC REFERRAL       **This order will print in the [facility] Scheduling Office**    Physical Therapy, Hand Therapy and Chiropractic Care are available through:    *[facility]  *[facility]  *[facility]    Call one number to schedule at any of the above locations: [phone].    Your provider has referred you to: [facility] - PT and/or Chiropractic Care determined by clinical presentation at [facility] or [facility] Initial Visit    Indication/Reason for Referral: Low Back Pain and SI joint pain  Onset of Illness 2 weeks  Therapy Orders: Evaluate and Treat  Special Programs: None  Special Request: None    Tania Marin      Additional Comments for the Therapist or Chiropractor: Perform stretching and strengthening exercises with modalities as you see fit.       Please be  "aware that coverage of these services is subject to the terms and limitations of your health insurance plan.  Call member services at your health plan with any benefit or coverage questions.      Please bring the following to your appointment:    *Your personal calendar for scheduling future appointments  *Comfortable clothing                  Future tests that were ordered for you today     Open Future Orders        Priority Expected Expires Ordered    XR Lumbar Spine 2/3 Views Routine 2017            Who to contact     If you have questions or need follow up information about today's clinic visit or your schedule please contact [facility] directly at [phone].  Normal or non-critical lab and imaging results will be communicated to you by Code Kingdomshart, letter or phone within 4 business days after the clinic has received the results. If you do not hear from us within 7 days, please contact the clinic through Code Kingdomshart or phone. If you have a critical or abnormal lab result, we will notify you by phone as soon as possible.  Submit refill requests through [email or web address] or call your pharmacy and they will forward the refill request to us. Please allow 3 business days for your refill to be completed.          Additional Information About Your Visit        Code KingdomsharMetabar Information     [email or web address] lets you send messages to your doctor, view your test results, renew your prescriptions, schedule appointments and more. To sign up, go to [email or web address] . Click on \"Log in\" on the left side of the screen, which will take you to the Welcome page. Then click on \"Sign up Now\" on the right side of the page.     You will be asked to enter the access code listed below, as well as some personal information. Please follow the directions to create your username and password.     Your access code is: [email or web address]  Expires: 10/24/2017  7:26 AM     Your access code will  in 90 days. If you need help or " a new code, please call your [facility] clinic or [phone].        Care EveryWhere ID     This is your Care EveryWhere ID. This could be used by other organizations to access your [facility] medical records  [id number]        Your Vitals Were     Pulse Temperature Respirations Last Period Pulse Oximetry BMI (Body Mass Index)    63 97.2  F (36.2  C) (Temporal) 16 05/12/2011 96% 28.78 kg/m2       Blood Pressure from Last 3 Encounters:   07/26/17 118/66   06/07/17 106/70   06/01/17 110/80    Weight from Last 3 Encounters:   07/26/17 169 lb (76.7 kg)   06/07/17 162 lb 8 oz (73.7 kg)   06/01/17 (P) 160 lb 9.6 oz (72.8 kg)              We Performed the Following     [facility], [facility] CH[facility]RACTIC REFERRAL          Today's Medication Changes          These changes are accurate as of: 7/26/17  7:26 AM.  If you have any questions, ask your nurse or doctor.               Start taking these medicines.        Dose/Directions    methylPREDNISolone 4 MG tablet   Commonly known as:  MEDROL DOSEPAK   Used for:  Lumbar back pain with radiculopathy affecting right lower extremity, Sacroiliac joint pain   Started by:  Allan Peña PA-C        Follow package instructions   Quantity:  21 tablet   Refills:  0            Where to get your medicines      These medications were sent to [facility] [address] [place] [address] [address] [address]     Phone:  [phone]     methylPREDNISolone 4 MG tablet                Primary Care Provider Office Phone # Fax #    Allan Peña PA-C [phone]       [address]  [facility] [address]        Equal Access to Services     CARLY LAWSON AH: Leah Ponce, sara guy, lillian kaalmada kelly, liane jimenez. So [facility] [phone].    ATENCIÓN: Si habla español, tiene a marley disposición servicios gratuitos de asistencia lingüística. Llame al  [phone].    We comply with applicable federal civil rights laws and [place] laws. We do not discriminate on the basis of race, color, national origin, age, disability sex, sexual orientation or gender identity.            Thank you!     Thank you for choosing [facility]  for your care. Our goal is always to provide you with excellent care. Hearing back from our patients is one way we can continue to improve our services. Please take a few minutes to complete the written survey that you may receive in the mail after your visit with us. Thank you!             Your Updated Medication List - Protect others around you: Learn how to safely use, store and throw away your medicines at www.disposemymeds.org.          This list is accurate as of: 7/26/17  7:26 AM.  Always use your most recent med list.                   Brand Name Dispense Instructions for use Diagnosis    escitalopram 10 MG tablet    LEXAPRO    30 tablet    TAKE ONE TABLET BY MOUTH EVERY DAY    Menopausal syndrome (hot flashes)       fluticasone 50 MCG/ACT spray    FLONASE    1 Bottle    Spray 1-2 sprays into both nostrils daily    Acute recurrent maxillary sinusitis       methylPREDNISolone 4 MG tablet    MEDROL DOSEPAK    21 tablet    Follow package instructions    Lumbar back pain with radiculopathy affecting right lower extremity, Sacroiliac joint pain       MULTIVITAMIN ADULT PO           traMADol 50 MG tablet    ULTRAM    30 tablet    Take 1 tablet (50 mg) by mouth every 6 hours as needed for pain    Post-operative state       * triamcinolone 0.5 % cream    KENALOG    30 g    Apply sparingly to affected area three times daily.    Eczema of both hands       * triamcinolone 0.1 % cream    KENALOG    80 g    Apply sparingly to affected area three times daily for 14 days at a time.    Eczema, unspecified type       * Notice:  This list has 2 medication(s) that are the same as other medications prescribed for you. Read the directions  carefully, and ask your doctor or other care provider to review them with you.

## 2017-07-26 NOTE — PATIENT INSTRUCTIONS
Will order an x-ray of your back to check your spine and lower back joints.  Will prescribe a steroid pack to use as directed.  Continue with Flexeril nightly as needed for muscle spasms and to help you sleep.  Continue with Aleve or Advil.  You can rotate between ice and heat.  Will send you to physical therapy for further treatment.  Perform home stretching exercise.   If symptoms are not improving, let me know.    If you chest pain occurs more frequently or starts to last longer, we need to look into a stress echocardiogram.

## 2017-07-26 NOTE — NURSING NOTE
"Chief Complaint   Patient presents with     Musculoskeletal Problem       Initial /66 (BP Location: Right arm, Patient Position: Chair, Cuff Size: Adult Regular)  Pulse 63  Temp 97.2  F (36.2  C) (Temporal)  Resp 16  Wt 169 lb (76.7 kg)  LMP 05/12/2011  SpO2 96%  BMI 28.78 kg/m2 Estimated body mass index is 28.78 kg/(m^2) as calculated from the following:    Height as of 4/26/17: 5' 4.25\" (1.632 m).    Weight as of this encounter: 169 lb (76.7 kg).  Medication Reconciliation: complete     Cynthia Ramos, BRIDGETTE      "

## 2017-09-04 ENCOUNTER — HOSPITAL ENCOUNTER (EMERGENCY)
Facility: CLINIC | Age: 57
Discharge: HOME | End: 2017-09-04
Attending: FAMILY MEDICINE | Admitting: FAMILY MEDICINE
Payer: COMMERCIAL

## 2017-09-04 VITALS
TEMPERATURE: 97.5 F | HEART RATE: 70 BPM | SYSTOLIC BLOOD PRESSURE: 134 MMHG | RESPIRATION RATE: 20 BRPM | OXYGEN SATURATION: 98 % | DIASTOLIC BLOOD PRESSURE: 95 MMHG

## 2017-09-04 DIAGNOSIS — R09.81 CONGESTION OF PARANASAL SINUS: ICD-10-CM

## 2017-09-04 PROCEDURE — 99282 EMERGENCY DEPT VISIT SF MDM: CPT | Mod: Z6 | Performed by: FAMILY MEDICINE

## 2017-09-04 PROCEDURE — 99282 EMERGENCY DEPT VISIT SF MDM: CPT | Performed by: FAMILY MEDICINE

## 2017-09-04 NOTE — ED AVS SNAPSHOT
[facility] Emergency Department    [address] [place] [address]    Phone:  [phone]    Fax:  [phone]                                       July Green   MRN: [id number]    Department:  [facility] Emergency Department   Date of Visit:  9/4/2017           After Visit Summary Signature Page     I have received my discharge instructions, and my questions have been answered. I have discussed any challenges I see with this plan with the nurse or doctor.    ..........................................................................................................................................  Patient/Patient Representative Signature      ..........................................................................................................................................  Patient Representative Print Name and Relationship to Patient    ..................................................               ................................................  Date                                            Time    ..........................................................................................................................................  Reviewed by Signature/Title    ...................................................              ..............................................  Date                                                            Time

## 2017-09-04 NOTE — ED AVS SNAPSHOT
[facility]    [address] [place] [address]    Phone:  [phone]    Fax:  [phone]                                       July Green   MRN: [id number]    Department:  [facility]   Date of Visit:  9/4/2017           Patient Information     Date Of Birth          1960        Your diagnoses for this visit were:     Congestion of paranasal sinus        You were seen by Mica Gracia MD.      Follow-up Information     Follow up with Allan Peña PA-C In 1 week.    Specialty:  Physician Assistant    Why:  if not improving    Contact information:    [address]  [facility] [address]  [phone]          Follow up with [facility].    Specialty:  EMERGENCY MEDICINE    Why:  If symptoms worsen    Contact information:    [address]   [place] [id number] [phone]    Additional information:    From [facility] [address]: Exit at [address] [place] [address] on south side of [place]. Turn right on [address]. Turn left at stoplight on [address]. [facility] will be in view two blocks ahead        Discharge Instructions       Thank you for giving us the opportunity to see you. The impression is that you have a viral upper respiratory infection causing your sinus congestion.    Continue supportive measures including saline nasal sprays, gentle steam inhalation, and hot packs as needed.    Take your antihistamine/decongestant as needed.    If you are not seeing an improvement within 7-10 days, please follow up with your primary care provider or clinic.     After discharge, please closely monitor for any new or worsening symptoms. Return to the Emergency Department at any time if your symptoms worsen.        Discharge References/Attachments     SINUSITIS (NO ANTIBIOTICS) (ENGLISH)      24 Hour Appointment Hotline       To make an appointment at any [facility] clinic, call  [phone] ([phone]). If you don't have a family doctor or clinic, we will help you find one. [facility] clinics are conveniently located to serve the needs of you and your family.             Review of your medicines      Our records show that you are taking the medicines listed below. If these are incorrect, please call your family doctor or clinic.        Dose / Directions Last dose taken    escitalopram 10 MG tablet   Commonly known as:  LEXAPRO   Quantity:  30 tablet        TAKE ONE TABLET BY MOUTH EVERY DAY   Refills:  5        fluticasone 50 MCG/ACT spray   Commonly known as:  FLONASE   Dose:  1-2 spray   Quantity:  1 Bottle        Spray 1-2 sprays into both nostrils daily   Refills:  0        methylPREDNISolone 4 MG tablet   Commonly known as:  MEDROL DOSEPAK   Quantity:  21 tablet        Follow package instructions   Refills:  0        MULTIVITAMIN ADULT PO        Refills:  0        traMADol 50 MG tablet   Commonly known as:  ULTRAM   Dose:  50 mg   Quantity:  30 tablet        Take 1 tablet (50 mg) by mouth every 6 hours as needed for pain   Refills:  0        * triamcinolone 0.5 % cream   Commonly known as:  KENALOG   Quantity:  30 g        Apply sparingly to affected area three times daily.   Refills:  1        * triamcinolone 0.1 % cream   Commonly known as:  KENALOG   Quantity:  80 g        Apply sparingly to affected area three times daily for 14 days at a time.   Refills:  2        * Notice:  This list has 2 medication(s) that are the same as other medications prescribed for you. Read the directions carefully, and ask your doctor or other care provider to review them with you.            Orders Needing Specimen Collection     None      Pending Results     No orders found from 9/2/2017 to 9/5/2017.            Pending Culture Results     No orders found from 9/2/2017 to 9/5/2017.            Pending Results Instructions     If you had any lab results that were not finalized at the time of your  "Discharge, you can call the ED Lab Result RN at [phone]. You will be contacted by this team for any positive Lab results or changes in treatment. The nurses are available 7 days a week from 10A to 6:30P.  You can leave a message 24 hours per day and they will return your call.        Thank you for choosing [facility]       Thank you for choosing [facility] for your care. Our goal is always to provide you with excellent care. Hearing back from our patients is one way we can continue to improve our services. Please take a few minutes to complete the written survey that you may receive in the mail after you visit with us. Thank you!        [employer] Information     [employer] lets you send messages to your doctor, view your test results, renew your prescriptions, schedule appointments and more. To sign up, go to [email or web address] . Click on \"Log in\" on the left side of the screen, which will take you to the Welcome page. Then click on \"Sign up Now\" on the right side of the page.     You will be asked to enter the access code listed below, as well as some personal information. Please follow the directions to create your username and password.     Your access code is: [email or web address]  Expires: 10/24/2017  7:26 AM     Your access code will  in 90 days. If you need help or a new code, please call your [facility] clinic or [phone].        Care EveryWhere ID     This is your Care EveryWhere ID. This could be used by other organizations to access your [facility] medical records  [id number]        Equal Access to Services     [facility] Center[facility] : Hadii mily mohro Soortiz, waaxda luqadaha, qaybta kaalmada adestivenyaalfonso, liane alvarado . So [facility] [phone].    ATENCIÓN: Si habla español, tiene a marley disposición servicios gratuitos de asistencia lingüística. Llame al [phone].    We comply with applicable federal civil rights laws and [place] laws. We do not discriminate on the basis of race, " color, national origin, age, disability sex, sexual orientation or gender identity.            After Visit Summary       This is your record. Keep this with you and show to your community pharmacist(s) and doctor(s) at your next visit.

## 2017-09-05 NOTE — DISCHARGE INSTRUCTIONS
Thank you for giving us the opportunity to see you. The impression is that you have a viral upper respiratory infection causing your sinus congestion.    Continue supportive measures including saline nasal sprays, gentle steam inhalation, and hot packs as needed.    Take your antihistamine/decongestant as needed.    If you are not seeing an improvement within 7-10 days, please follow up with your primary care provider or clinic.     After discharge, please closely monitor for any new or worsening symptoms. Return to the Emergency Department at any time if your symptoms worsen.

## 2017-09-05 NOTE — ED NOTES
"Pt has cold symptoms since Friday.  Cough, sore throat, congestion, green phlegm and states \"I can taste the infection\".    "

## 2017-09-05 NOTE — ED PROVIDER NOTES
ED Provider Note     CC:     Chief Complaint   Patient presents with     Pharyngitis     Otalgia          History is obtained from the patient.    HPI: July Green is a 56 year old female presenting with cold symptoms that started Friday, with slight cough, nasal congestion, and a taste in the back of the throat.  Patient feels that there is drainage more from the sinuses and not into the chest.  She has been prone to sinus infections getting 3 or 4 episodes about the year.  She does not smoke and has no significant allergy symptoms right now.  Patient reports no associated fever, chills, significant nasal drainage.  She had been treated with antibiotics on a couple of occasions.  She has been treated with Flonase in the past, but has not used anything yet except for over-the-counter Sudafed.  Patient does have some fullness and pressure in the ears as well.  Patient has had previous tonsillectomy.      Patient Active Problem List   Diagnosis     CARDIOVASCULAR SCREENING; LDL GOAL LESS THAN 160     Menopausal syndrome (hot flashes)     Eczema     Bladder prolapse, female, acquired     Seasonal allergic rhinitis     Lipoma of skin and subcutaneous tissue       MEDS:   No current facility-administered medications on file prior to encounter.   Current Outpatient Prescriptions on File Prior to Encounter:  methylPREDNISolone (MEDROL DOSEPAK) 4 MG tablet Follow package instructions   escitalopram (LEXAPRO) 10 MG tablet TAKE ONE TABLET BY MOUTH EVERY DAY   fluticasone (FLONASE) 50 MCG/ACT spray Spray 1-2 sprays into both nostrils daily (Patient not taking: Reported on 7/26/2017)   traMADol (ULTRAM) 50 MG tablet Take 1 tablet (50 mg) by mouth every 6 hours as needed for pain   triamcinolone (KENALOG) 0.1 % cream Apply sparingly to affected area three times daily for 14 days at a time. (Patient not taking: Reported on 6/1/2017)   Multiple Vitamins-Minerals  (MULTIVITAMIN ADULT PO)    triamcinolone (KENALOG) 0.5 % cream Apply sparingly to affected area three times daily. (Patient not taking: Reported on 6/1/2017)       Allergies: Percocet [oxycodone-acetaminophen]; Tylenol w/codeine [acetaminophen-codeine]; and Vicodin [hydrocodone-acetaminophen]    Triage and ursing notes were reviewed.    ROS: Negative except in HPI    Physical Exam:  Vitals:    09/04/17 2004   BP: (!) 134/95   Pulse: 70   Resp: 20   Temp: 97.5  F (36.4  C)   SpO2: 98%     GENERAL APPEARANCE: Alert, no distress   HEAD: atraumatic  EYES: PERRL, EOMI  HENT: oral exam benign; TMs appear normal; mild maxillary sinus tenderness; nasal mucosa appears normal with no purulent drainage.  No significant septal deviation; oropharynx is noninjected  NECK: no adenopathy or masses, trachea is midline  RESP: lungs clear to auscultation - no rales, rhonchi or wheezes  CV: regular rate and rhythm, no significant murmurs or rubs  ABDOMEN: soft, nontender, no masses with normal bowel sounds  SKIN: no rash; as above  NEURO: mentation and speech normal; no focal deficits    No results found for this or any previous visit (from the past 24 hour(s)).        Impression:  Final diagnoses:   Congestion of paranasal sinus         ED Course & Medical Decision Making (Plan):  July Green is a 56 year old female with 3 day history of URI symptoms primarily symptoms of a head cold.  She does not smoke and has no active allergy symptoms.  Her physical exam is benign.  I suspect she has a viral URI, and I recommended conservative and supportive measures over the next week.  I explained that most of these infections are from viral etiology and not bacterial.  Monitor for fever, severe facial pain, purulent nasal drainage, or worsening symptoms.  Consider antibiotics if symptoms persist beyond 10 days.  Begin nasal saline sprays, and Flonase as needed.  She can continue Sudafed if it helps during the day.  Follow-up in the  clinic if not improving.  Return to the ED at any time if acute worsening.  Written after-visit summary and instructions were given at the time of discharge.          Discharge Medication List as of 9/4/2017  8:38 PM              This note was completed in part using Dragon voice recognition, and may contain word and grammatical errors.        Mica Gracia MD  09/05/17 0054

## 2017-09-07 ENCOUNTER — OFFICE VISIT (OUTPATIENT)
Dept: FAMILY MEDICINE | Facility: OTHER | Age: 57
End: 2017-09-07
Payer: COMMERCIAL

## 2017-09-07 VITALS
WEIGHT: 169 LBS | DIASTOLIC BLOOD PRESSURE: 80 MMHG | OXYGEN SATURATION: 97 % | TEMPERATURE: 97 F | SYSTOLIC BLOOD PRESSURE: 110 MMHG | HEART RATE: 72 BPM | BODY MASS INDEX: 28.78 KG/M2

## 2017-09-07 DIAGNOSIS — J98.01 ACUTE BRONCHOSPASM: ICD-10-CM

## 2017-09-07 DIAGNOSIS — J20.9 ACUTE BRONCHITIS, UNSPECIFIED ORGANISM: Primary | ICD-10-CM

## 2017-09-07 PROCEDURE — 99213 OFFICE O/P EST LOW 20 MIN: CPT | Performed by: FAMILY MEDICINE

## 2017-09-07 RX ORDER — AZITHROMYCIN 250 MG/1
TABLET, FILM COATED ORAL
Qty: 6 TABLET | Refills: 0 | Status: SHIPPED | OUTPATIENT
Start: 2017-09-07 | End: 2017-09-12

## 2017-09-07 RX ORDER — ALBUTEROL SULFATE 90 UG/1
2 AEROSOL, METERED RESPIRATORY (INHALATION) EVERY 6 HOURS PRN
Qty: 1 INHALER | Refills: 1 | Status: SHIPPED | OUTPATIENT
Start: 2017-09-07 | End: 2018-05-08

## 2017-09-07 ASSESSMENT — LIFESTYLE VARIABLES: SMOKING_STATUS: 0

## 2017-09-07 ASSESSMENT — ENCOUNTER SYMPTOMS
EYE REDNESS: 0
COUGH: 1
HEADACHES: 1
SORE THROAT: 0
SHORTNESS OF BREATH: 0
CHILLS: 0
SWEATS: 0
WHEEZING: 1
RHINORRHEA: 1

## 2017-09-07 ASSESSMENT — PAIN SCALES - GENERAL: PAINLEVEL: NO PAIN (0)

## 2017-09-07 NOTE — PROGRESS NOTES
SUBJECTIVE:                                                    July Green is a 56 year old female who presents to clinic today for the following health issues:      Cough   This is a new (Pt was seen at [facility] on Monday for sx. ) problem. The current episode started more than 1 week ago. Episode frequency: Pt coughs more at night when she lays down vs during the day.  The problem has been gradually worsening. The cough is productive of sputum (color of sputum is bright green. ). Maximum temperature: Pt is unsure of fever, but at times felt like she had one. No fever is present currently.  Associated symptoms include ear congestion, headaches, rhinorrhea and wheezing. Pertinent negatives include no chest pain, no chills, no sweats, no ear pain, no sore throat, no shortness of breath and no eye redness. Associated symptoms comments: Yesterday pt had a HA in her frontal lobe above eyes. . Treatments tried: Sudafed, nasal spray, tylenol sinus, steam, and wash cloth over forehead. The treatment provided moderate (The Sudafed has helped. ) relief. Risk factors include animal exposure (Pt has a cat. ). She is not a smoker.       -------------------------------------    Problem list and histories reviewed & adjusted, as indicated.  Additional history: as documented        Patient Active Problem List   Diagnosis     CARDIOVASCULAR SCREENING; LDL GOAL LESS THAN 160     Menopausal syndrome (hot flashes)     Eczema     Bladder prolapse, female, acquired     Seasonal allergic rhinitis     Lipoma of skin and subcutaneous tissue     Past Surgical History:   Procedure Laterality Date     CYSTOSCOPY, SLING TRANSVAGINAL N/A 5/4/2017    Procedure: CYSTOSCOPY, SLING TRANSVAGINAL;  cystoscopy with pubovaginal sling, cystocele repair with mesh; sacralspinous fixation;  Surgeon: Yayo Tirado MD;  Location:  OR     SURGICAL HISTORY OF -   1984    L wrist cyst removed     TONSILLECTOMY & ADENOIDECTOMY  1980     TUBAL  LIGATION         Social History   Substance Use Topics     Smoking status: Former Smoker     Smokeless tobacco: Never Used     Alcohol use No     Family History   Problem Relation Age of Onset     Allergies Mother      Arthritis Mother      CANCER Mother      HEART DISEASE Maternal Grandfather      Asthma Sister      Allergies Sister      Arthritis Sister      Allergies Son      Allergies Daughter          Current Outpatient Prescriptions   Medication Sig Dispense Refill     azithromycin (ZITHROMAX) 250 MG tablet Two tablets first day, then one tablet daily for four days. 6 tablet 0     albuterol (PROAIR HFA/PROVENTIL HFA/VENTOLIN HFA) 108 (90 BASE) MCG/ACT Inhaler Inhale 2 puffs into the lungs every 6 hours as needed for shortness of breath / dyspnea or wheezing 1 Inhaler 1     escitalopram (LEXAPRO) 10 MG tablet TAKE ONE TABLET BY MOUTH EVERY DAY (Patient not taking: Reported on 9/7/2017) 30 tablet 5     fluticasone (FLONASE) 50 MCG/ACT spray Spray 1-2 sprays into both nostrils daily (Patient not taking: Reported on 7/26/2017) 1 Bottle 0     traMADol (ULTRAM) 50 MG tablet Take 1 tablet (50 mg) by mouth every 6 hours as needed for pain (Patient not taking: Reported on 9/7/2017) 30 tablet 0     triamcinolone (KENALOG) 0.1 % cream Apply sparingly to affected area three times daily for 14 days at a time. (Patient not taking: Reported on 6/1/2017) 80 g 2     Multiple Vitamins-Minerals (MULTIVITAMIN ADULT PO)        triamcinolone (KENALOG) 0.5 % cream Apply sparingly to affected area three times daily. (Patient not taking: Reported on 6/1/2017) 30 g 1     Allergies   Allergen Reactions     Percocet [Oxycodone-Acetaminophen] Nausea     Tylenol W/Codeine [Acetaminophen-Codeine] Nausea     Vicodin [Hydrocodone-Acetaminophen] Other (See Comments)     Hot flashes     BP Readings from Last 3 Encounters:   09/07/17 110/80   09/04/17 (!) 134/95   07/26/17 118/66    Wt Readings from Last 3 Encounters:   09/07/17 169 lb (76.7 kg)    07/26/17 169 lb (76.7 kg)   06/07/17 162 lb 8 oz (73.7 kg)                  Labs reviewed in EPIC        ROS:  Constitutional, HEENT, cardiovascular, pulmonary, GI, , musculoskeletal, neuro, skin, endocrine and psych systems are negative, except as otherwise noted.      OBJECTIVE:   /80 (BP Location: Left arm, Patient Position: Sitting, Cuff Size: Adult Regular)  Pulse 72  Temp 97  F (36.1  C) (Temporal)  Wt 169 lb (76.7 kg)  LMP 05/12/2011  SpO2 97%  BMI 28.78 kg/m2  Body mass index is 28.78 kg/(m^2).   Physical Exam   Constitutional: She appears well-developed and well-nourished.   HENT:   Head: Normocephalic and atraumatic.   Eyes: EOM are normal.   Neck: Neck supple.   Cardiovascular: Normal rate, regular rhythm and normal heart sounds.    Pulmonary/Chest: Effort normal and breath sounds normal. No respiratory distress. She has no wheezes. She has no rales. She exhibits no tenderness.   Psychiatric: She has a normal mood and affect.         Diagnostic Test Results:  none     ASSESSMENT/PLAN:     Problem List Items Addressed This Visit     None      Visit Diagnoses     Acute bronchitis, unspecified organism    -  Primary    Relevant Medications    azithromycin (ZITHROMAX) 250 MG tablet    Acute bronchospasm        Relevant Medications    albuterol (PROAIR HFA/PROVENTIL HFA/VENTOLIN HFA) 108 (90 BASE) MCG/ACT Inhaler         Antibiotics and inhalers as prescribed  Discussed home care  Reportable signs and symptoms discussed  RTC if symptoms persist or fail to improve      Anaya Hernandez MD  [facility] [place] [facility]

## 2017-09-07 NOTE — NURSING NOTE
"Chief Complaint   Patient presents with     Cough       Initial /80 (BP Location: Left arm, Patient Position: Sitting, Cuff Size: Adult Regular)  Pulse 72  Temp 97  F (36.1  C) (Temporal)  Wt 169 lb (76.7 kg)  LMP 05/12/2011  SpO2 97%  BMI 28.78 kg/m2 Estimated body mass index is 28.78 kg/(m^2) as calculated from the following:    Height as of 4/26/17: 5' 4.25\" (1.632 m).    Weight as of this encounter: 169 lb (76.7 kg).  Medication Reconciliation: complete   Zhou Kevin MA  September 7, 2017      "

## 2017-09-07 NOTE — MR AVS SNAPSHOT
"              After Visit Summary   2017    July Green    MRN: [id number]           Patient Information     Date Of Birth          1960        Visit Information        Provider Department      2017 1:40 PM Anaya Hernandez MD [facility]        Today's Diagnoses     Acute bronchitis, unspecified organism    -  1    Acute bronchospasm           Follow-ups after your visit        Who to contact     If you have questions or need follow up information about today's clinic visit or your schedule please contact [facility] [place] directly at [phone].  Normal or non-critical lab and imaging results will be communicated to you by [employer], letter or phone within 4 business days after the clinic has received the results. If you do not hear from us within 7 days, please contact the clinic through [employer] or phone. If you have a critical or abnormal lab result, we will notify you by phone as soon as possible.  Submit refill requests through [employer] or call your pharmacy and they will forward the refill request to us. Please allow 3 business days for your refill to be completed.          Additional Information About Your Visit        MyChart Information     [employer] lets you send messages to your doctor, view your test results, renew your prescriptions, schedule appointments and more. To sign up, go to [email or web address] . Click on \"Log in\" on the left side of the screen, which will take you to the Welcome page. Then click on \"Sign up Now\" on the right side of the page.     You will be asked to enter the access code listed below, as well as some personal information. Please follow the directions to create your username and password.     Your access code is: [email or web address]  Expires: 10/24/2017  7:26 AM     Your access code will  in 90 days. If you need help or a new code, please call your [facility] or [phone].        Care EveryWhere ID     This is your Care " EveryWhere ID. This could be used by other organizations to access your [facility] medical records  [id number]        Your Vitals Were     Pulse Temperature Last Period Pulse Oximetry BMI (Body Mass Index)       72 97  F (36.1  C) (Temporal) 05/12/2011 97% 28.78 kg/m2        Blood Pressure from Last 3 Encounters:   09/07/17 110/80   09/04/17 (!) 134/95   07/26/17 118/66    Weight from Last 3 Encounters:   09/07/17 169 lb (76.7 kg)   07/26/17 169 lb (76.7 kg)   06/07/17 162 lb 8 oz (73.7 kg)              Today, you had the following     No orders found for display         Today's Medication Changes          These changes are accurate as of: 9/7/17  2:07 PM.  If you have any questions, ask your nurse or doctor.               Start taking these medicines.        Dose/Directions    albuterol 108 (90 BASE) MCG/ACT Inhaler   Commonly known as:  PROAIR HFA/PROVENTIL HFA/VENTOLIN HFA   Used for:  Acute bronchospasm   Started by:  Anaya Hernandez MD        Dose:  2 puff   Inhale 2 puffs into the lungs every 6 hours as needed for shortness of breath / dyspnea or wheezing   Quantity:  1 Inhaler   Refills:  1       azithromycin 250 MG tablet   Commonly known as:  ZITHROMAX   Used for:  Acute bronchitis, unspecified organism   Started by:  Anaya Hernandez MD        Two tablets first day, then one tablet daily for four days.   Quantity:  6 tablet   Refills:  0            Where to get your medicines      These medications were sent to [facility] [address] [place] [id number] [facility]  [address] [facility] [address]     Phone:  [phone]     albuterol 108 (90 BASE) MCG/ACT Inhaler    azithromycin 250 MG tablet                Primary Care Provider Office Phone # Fax #    Allan Peña PA-C [phone]       [address] [place] [address]  [facility] [address]        Equal Access to Services     CITLALY LAWSON AH: Leah Ponce, sara guy, liane velez  eliceo maikolstiven cliffchar laninoberna ah. So [facility] [phone].    ATENCIÓN: Si ingala grupo, tiene a marley disposición servicios gratuitos de asistencia lingüística. Shankar barraza [phone].    We comply with applicable federal civil rights laws and [place] laws. We do not discriminate on the basis of race, color, national origin, age, disability sex, sexual orientation or gender identity.            Thank you!     Thank you for choosing [facility]  for your care. Our goal is always to provide you with excellent care. Hearing back from our patients is one way we can continue to improve our services. Please take a few minutes to complete the written survey that you may receive in the mail after your visit with us. Thank you!             Your Updated Medication List - Protect others around you: Learn how to safely use, store and throw away your medicines at www.disposemymeds.org.          This list is accurate as of: 9/7/17  2:07 PM.  Always use your most recent med list.                   Brand Name Dispense Instructions for use Diagnosis    albuterol 108 (90 BASE) MCG/ACT Inhaler    PROAIR HFA/PROVENTIL HFA/VENTOLIN HFA    1 Inhaler    Inhale 2 puffs into the lungs every 6 hours as needed for shortness of breath / dyspnea or wheezing    Acute bronchospasm       azithromycin 250 MG tablet    ZITHROMAX    6 tablet    Two tablets first day, then one tablet daily for four days.    Acute bronchitis, unspecified organism       escitalopram 10 MG tablet    LEXAPRO    30 tablet    TAKE ONE TABLET BY MOUTH EVERY DAY    Menopausal syndrome (hot flashes)       fluticasone 50 MCG/ACT spray    FLONASE    1 Bottle    Spray 1-2 sprays into both nostrils daily    Acute recurrent maxillary sinusitis       MULTIVITAMIN ADULT PO           traMADol 50 MG tablet    ULTRAM    30 tablet    Take 1 tablet (50 mg) by mouth every 6 hours as needed for pain    Post-operative state       * triamcinolone 0.5 % cream    KENALOG    30 g     Apply sparingly to affected area three times daily.    Eczema of both hands       * triamcinolone 0.1 % cream    KENALOG    80 g    Apply sparingly to affected area three times daily for 14 days at a time.    Eczema, unspecified type       * Notice:  This list has 2 medication(s) that are the same as other medications prescribed for you. Read the directions carefully, and ask your doctor or other care provider to review them with you.

## 2017-10-09 ENCOUNTER — OFFICE VISIT (OUTPATIENT)
Dept: URGENT CARE | Facility: RETAIL CLINIC | Age: 57
End: 2017-10-09
Payer: COMMERCIAL

## 2017-10-09 VITALS — HEART RATE: 64 BPM | TEMPERATURE: 97.7 F | SYSTOLIC BLOOD PRESSURE: 129 MMHG | DIASTOLIC BLOOD PRESSURE: 81 MMHG

## 2017-10-09 DIAGNOSIS — J06.9 ACUTE URI: Primary | ICD-10-CM

## 2017-10-09 DIAGNOSIS — R09.81 NASAL CONGESTION: ICD-10-CM

## 2017-10-09 PROCEDURE — 99203 OFFICE O/P NEW LOW 30 MIN: CPT | Performed by: PHYSICIAN ASSISTANT

## 2017-10-09 RX ORDER — FLUTICASONE PROPIONATE 50 MCG
2 SPRAY, SUSPENSION (ML) NASAL DAILY
Qty: 1 BOTTLE | Refills: 6 | Status: SHIPPED | OUTPATIENT
Start: 2017-10-09 | End: 2023-03-20

## 2017-10-09 NOTE — PATIENT INSTRUCTIONS
For nasal congestion/cold symptoms  Start steroid nasal spray daily and for a few days after congestion clears    Take Sudafed (nasal decongestant) for congestion/ear pressure  Mucinex (guaifenesin) to thin out secretions  Apply warm facial compresses/packs for 5-10 minutes three times daily.  Drink plenty of fluids- 6 to 10 glasses of liquids each day. Rest.  Saline drops or nasal sprays as needed.   Steam treatments or humidifier.  Tylenol or ibuprofen as needed for pain or fever  Follow up at your primary care clinic for increasing pain, high fever, vision changes, worsening symptoms, or no relief from symptoms after 7-10 days.  Please follow up with primary care provider if not improving, worsening or new symptoms

## 2017-10-09 NOTE — NURSING NOTE
"Chief Complaint   Patient presents with     Nasal Congestion     3 days; runny     Sinus Problem     pressure in forehead, right side of face     Ent Problem     ears are clogged       Initial /81 (BP Location: Left arm)  Pulse 64  Temp 97.7  F (36.5  C) (Temporal)  LMP 05/12/2011 Estimated body mass index is 28.78 kg/(m^2) as calculated from the following:    Height as of 4/26/17: 5' 4.25\" (1.632 m).    Weight as of 9/7/17: 169 lb (76.7 kg).  Medication Reconciliation: complete  "

## 2017-10-09 NOTE — MR AVS SNAPSHOT
"              After Visit Summary   10/9/2017    July Green    MRN: [id number]           Patient Information     Date Of Birth          1960        Visit Information        Provider Department      10/9/2017 2:40 PM Any Ann PA-C [facility]        Today's Diagnoses     Acute URI    -  1    Nasal congestion          Care Instructions    For nasal congestion/cold symptoms  Start steroid nasal spray daily and for a few days after congestion clears    Take Sudafed (nasal decongestant) for congestion/ear pressure  Mucinex (guaifenesin) to thin out secretions  Apply warm facial compresses/packs for 5-10 minutes three times daily.  Drink plenty of fluids- 6 to 10 glasses of liquids each day. Rest.  Saline drops or nasal sprays as needed.   Steam treatments or humidifier.  Tylenol or ibuprofen as needed for pain or fever  Follow up at your primary care clinic for increasing pain, high fever, vision changes, worsening symptoms, or no relief from symptoms after 7-10 days.  Please follow up with primary care provider if not improving, worsening or new symptoms            Follow-ups after your visit        Who to contact     You can reach your care team any time of the day by calling [phone].  Notification of test results:  If you have an abnormal lab result, we will notify you by phone as soon as possible.         Additional Information About Your Visit        MyChart Information     [employer] lets you send messages to your doctor, view your test results, renew your prescriptions, schedule appointments and more. To sign up, go to [email or web address] . Click on \"Log in\" on the left side of the screen, which will take you to the Welcome page. Then click on \"Sign up Now\" on the right side of the page.     You will be asked to enter the access code listed below, as well as some personal information. Please follow the directions to create your username and password.     Your " access code is: [email or web address]  Expires: 10/24/2017  7:26 AM     Your access code will  in 90 days. If you need help or a new code, please call your [facility] or [phone].        Care EveryWhere ID     This is your Care EveryWhere ID. This could be used by other organizations to access your [place] medical records  [id number]        Your Vitals Were     Pulse Temperature Last Period             64 97.7  F (36.5  C) (Temporal) 2011          Blood Pressure from Last 3 Encounters:   10/09/17 129/81   17 110/80   17 (!) 134/95    Weight from Last 3 Encounters:   17 169 lb (76.7 kg)   17 169 lb (76.7 kg)   17 162 lb 8 oz (73.7 kg)              Today, you had the following     No orders found for display         Today's Medication Changes          These changes are accurate as of: 10/9/17  3:26 PM.  If you have any questions, ask your nurse or doctor.               These medicines have changed or have updated prescriptions.        Dose/Directions    * fluticasone 50 MCG/ACT spray   Commonly known as:  FLONASE   This may have changed:  Another medication with the same name was added. Make sure you understand how and when to take each.   Used for:  Acute recurrent maxillary sinusitis        Dose:  1-2 spray   Spray 1-2 sprays into both nostrils daily   Quantity:  1 Bottle   Refills:  0       * fluticasone 50 MCG/ACT spray   Commonly known as:  FLONASE   This may have changed:  You were already taking a medication with the same name, and this prescription was added. Make sure you understand how and when to take each.   Used for:  Acute URI        Dose:  2 spray   Spray 2 sprays into both nostrils daily   Quantity:  1 Bottle   Refills:  6       * Notice:  This list has 2 medication(s) that are the same as other medications prescribed for you. Read the directions carefully, and ask your doctor or other care provider to review them with you.         Where to get your medicines       These medications were sent to [facility] [address] [place], [place] - [address] [facility]  [address] [facility] [address]     Phone:  [phone]     fluticasone 50 MCG/ACT spray                Primary Care Provider Office Phone # Fax #    Allan Peña PA-C [phone]       [address]  [place] [address]        Equal Access to Services     CITLALY LAWSON : Hadii aad ku hadasho Soomaali, waaxda luqadaha, qaybta kaalmada adeegyada, waxay idiin hayaan adeeg honey alvarado . So [facility] [phone].    ATENCIÓN: Si habla espabby, tiene a marley disposición servicios gratuitos de asistencia lingüística. [facility] [phone].    We comply with applicable federal civil rights laws and [place] laws. We do not discriminate on the basis of race, color, national origin, age, disability, sex, sexual orientation, or gender identity.            Thank you!     Thank you for choosing [facility]  for your care. Our goal is always to provide you with excellent care. Hearing back from our patients is one way we can continue to improve our services. Please take a few minutes to complete the written survey that you may receive in the mail after your visit with us. Thank you!             Your Updated Medication List - Protect others around you: Learn how to safely use, store and throw away your medicines at www.disposemymeds.org.          This list is accurate as of: 10/9/17  3:26 PM.  Always use your most recent med list.                   Brand Name Dispense Instructions for use Diagnosis    albuterol 108 (90 BASE) MCG/ACT Inhaler    PROAIR HFA/PROVENTIL HFA/VENTOLIN HFA    1 Inhaler    Inhale 2 puffs into the lungs every 6 hours as needed for shortness of breath / dyspnea or wheezing    Acute bronchospasm       escitalopram 10 MG tablet    LEXAPRO    30 tablet    TAKE ONE TABLET BY MOUTH EVERY DAY    Menopausal syndrome (hot flashes)       * fluticasone 50 MCG/ACT spray     FLONASE    1 Bottle    Spray 1-2 sprays into both nostrils daily    Acute recurrent maxillary sinusitis       * fluticasone 50 MCG/ACT spray    FLONASE    1 Bottle    Spray 2 sprays into both nostrils daily    Acute URI       MULTIVITAMIN ADULT PO           traMADol 50 MG tablet    ULTRAM    30 tablet    Take 1 tablet (50 mg) by mouth every 6 hours as needed for pain    Post-operative state       * triamcinolone 0.5 % cream    KENALOG    30 g    Apply sparingly to affected area three times daily.    Eczema of both hands       * triamcinolone 0.1 % cream    KENALOG    80 g    Apply sparingly to affected area three times daily for 14 days at a time.    Eczema, unspecified type       * Notice:  This list has 4 medication(s) that are the same as other medications prescribed for you. Read the directions carefully, and ask your doctor or other care provider to review them with you.

## 2017-10-10 ENCOUNTER — TELEPHONE (OUTPATIENT)
Dept: FAMILY MEDICINE | Facility: OTHER | Age: 57
End: 2017-10-10

## 2017-10-10 NOTE — LETTER
[facility] [place]  [address]   [place] [address]  Phone: [phone]  October 25, 2017      July Green  [address]   [place] [address]      Dear July,    We care about your health and have reviewed your health plan including your medical conditions, medications, and lab results.  Based on this review, it is recommended that you follow up regarding the following health topic(s):  -Breast Cancer Screening  -Colon Cancer Screening    We recommend you take the following action(s):  -schedule a MAMMOGRAM which is due. Please disregard this reminder if you have had this exam elsewhere within the last 1-2 years please let us know so we can update your records.  -schedule a COLONOSCOPY to look for colon cancer (due every 10 years or 5 years in higher risk situations.)  Colonoscopies can prevent 90-95% of colon cancer deaths.  Problem lesions can be removed before they ever become cancer.  If you do not wish to do a colonoscopy or cannot afford to do one at this time, there is another option called a Fecal Immunochemical Occult Blood Test (FIT) a take home stool sample kit.  It does not replace the colonoscopy for colorectal cancer screening, but it can detect hidden bleeding in the lower colon.  It does need to be repeated every year and if a positive result is obtained, you would be referred for a colonoscopy.  If you have completed either one of these tests at another facility, please have the records sent to our clinic for our records.     Please call us at the [facility] - [phone] (or use [employer]) to address the above recommendations.     Thank you for trusting [facility] and we appreciate the opportunity to serve you.  We look forward to supporting your healthcare needs in the future.    Healthy Regards,    Your Health Care Team  [facility]

## 2017-10-10 NOTE — TELEPHONE ENCOUNTER
Summary:    Patient is due/failing the following:   FIT and MAMMOGRAM    Action needed:   Complete a FIT test and schedule a mammogram     Type of outreach:    Phone, left message for patient to call back.     Questions for provider review:    None                                                                                                                                    Ambika Yen       Chart routed to Care Team .      Panel Management Review      Patient has the following on her problem list: None      Composite cancer screening  Chart review shows that this patient is due/due soon for the following Mammogram and Fecal Colorectal (FIT)

## 2018-02-26 ENCOUNTER — TELEPHONE (OUTPATIENT)
Dept: FAMILY MEDICINE | Facility: OTHER | Age: 58
End: 2018-02-26

## 2018-02-26 NOTE — LETTER
[facility]  [address]   [place] [address]  Phone: [phone]  March 1, 2018      July Green  [address]   [place] [address]      Dear July,    We care about your health and have reviewed your health plan including your medical conditions, medications, and lab results.  Based on this review, it is recommended that you follow up regarding the following health topic(s):  -Breast Cancer Screening  -Colon Cancer Screening    We recommend you take the following action(s):  -schedule a MAMMOGRAM which is due. Please disregard this reminder if you have had this exam elsewhere within the last 1-2 years please let us know so we can update your records.  -schedule a COLONOSCOPY to look for colon cancer (due every 10 years or 5 years in higher risk situations.)  Colonoscopies can prevent 90-95% of colon cancer deaths.  Problem lesions can be removed before they ever become cancer.  If you do not wish to do a colonoscopy or cannot afford to do one at this time, there is another option called a Fecal Immunochemical Occult Blood Test (FIT) a take home stool sample kit.  It does not replace the colonoscopy for colorectal cancer screening, but it can detect hidden bleeding in the lower colon.  It does need to be repeated every year and if a positive result is obtained, you would be referred for a colonoscopy.  If you have completed either one of these tests at another facility, please have the records sent to our clinic for our records.     Please call us at the [facility] - [phone] (or use [employer]) to address the above recommendations.     Thank you for trusting [facility] and we appreciate the opportunity to serve you.  We look forward to supporting your healthcare needs in the future.    Healthy Regards,    Your Health Care Team  [facility]

## 2018-02-26 NOTE — TELEPHONE ENCOUNTER
Summary:    Patient is due/failing the following:   FIT and MAMMOGRAM    Action needed:   Schedule a mammogram and complete a FIT test     Type of outreach:    Phone, left message for patient to call back.       Questions for provider review:    None                                                                                                                                    Ambika Yen     Chart routed to Care Team .        Panel Management Review      Patient has the following on her problem list: None      Composite cancer screening  Chart review shows that this patient is due/due soon for the following Mammogram and Fecal Colorectal (FIT)

## 2018-03-30 ENCOUNTER — RADIANT APPOINTMENT (OUTPATIENT)
Dept: GENERAL RADIOLOGY | Facility: OTHER | Age: 58
End: 2018-03-30
Attending: PHYSICIAN ASSISTANT
Payer: COMMERCIAL

## 2018-03-30 ENCOUNTER — OFFICE VISIT (OUTPATIENT)
Dept: FAMILY MEDICINE | Facility: OTHER | Age: 58
End: 2018-03-30
Payer: COMMERCIAL

## 2018-03-30 VITALS
RESPIRATION RATE: 16 BRPM | WEIGHT: 155 LBS | BODY MASS INDEX: 26.46 KG/M2 | TEMPERATURE: 98 F | OXYGEN SATURATION: 95 % | SYSTOLIC BLOOD PRESSURE: 120 MMHG | HEART RATE: 79 BPM | DIASTOLIC BLOOD PRESSURE: 70 MMHG | HEIGHT: 64 IN

## 2018-03-30 DIAGNOSIS — M25.562 ACUTE PAIN OF LEFT KNEE: ICD-10-CM

## 2018-03-30 DIAGNOSIS — Z12.11 SCREEN FOR COLON CANCER: ICD-10-CM

## 2018-03-30 DIAGNOSIS — M25.461 KNEE EFFUSION, RIGHT: ICD-10-CM

## 2018-03-30 DIAGNOSIS — M25.462 KNEE EFFUSION, LEFT: ICD-10-CM

## 2018-03-30 DIAGNOSIS — M25.561 ACUTE PAIN OF RIGHT KNEE: ICD-10-CM

## 2018-03-30 DIAGNOSIS — M17.12 PRIMARY OSTEOARTHRITIS OF LEFT KNEE: Primary | ICD-10-CM

## 2018-03-30 DIAGNOSIS — M17.11 PRIMARY OSTEOARTHRITIS OF RIGHT KNEE: ICD-10-CM

## 2018-03-30 PROCEDURE — 99213 OFFICE O/P EST LOW 20 MIN: CPT | Performed by: PHYSICIAN ASSISTANT

## 2018-03-30 PROCEDURE — 73562 X-RAY EXAM OF KNEE 3: CPT | Mod: LT

## 2018-03-30 ASSESSMENT — PAIN SCALES - GENERAL: PAINLEVEL: MODERATE PAIN (4)

## 2018-03-30 NOTE — NURSING NOTE
"Chief Complaint   Patient presents with     Knee Pain     Panel Management     Mammo, FIT, AD, Height, Mychart       Initial /70 (BP Location: Right arm, Patient Position: Chair, Cuff Size: Adult Regular)  Pulse 79  Temp 98  F (36.7  C) (Temporal)  Resp 16  Ht 5' 4\" (1.626 m)  Wt 155 lb (70.3 kg)  LMP 05/12/2011  SpO2 95%  BMI 26.61 kg/m2 Estimated body mass index is 26.61 kg/(m^2) as calculated from the following:    Height as of this encounter: 5' 4\" (1.626 m).    Weight as of this encounter: 155 lb (70.3 kg).  Medication Reconciliation: complete     Cynthia Ramos CMA      "

## 2018-03-30 NOTE — MR AVS SNAPSHOT
After Visit Summary   3/30/2018    July Green    MRN: [id number]           Patient Information     Date Of Birth          1960        Visit Information        Provider Department      3/30/2018 8:30 AM Allan Peña PA-C [facility]        Today's Diagnoses     Primary osteoarthritis of right knee    -  1    Acute pain of right knee        Knee effusion, right        Screen for colon cancer          Care Instructions    I believe you have arthritis in your knee.  I recommend you continue with ibuprofen, rest, ice, and elevation.  Try to avoid quick movements and prolonged walking and standing.  I recommend a soft knee brace for comfort and stability.   Depending on x-ray results, I may send you to orthopedics.  If pain is worsening or not improving over the next month, let me know and I will send you to orthopedics or potentially order an MRI.    Bring in your FIT testing when you can.      Osteoarthritis  Osteoarthritis (also called degenerative joint disease) happens when the cartilage in a joint becomes damaged and worn. This may be due to age, wear and tear, overuse of the joint, or other problems. Osteoarthritis can affect any joint. But it is most common in hands, knees, spine, hips, and feet. Symptoms include joint stiffness, pain, and swelling.  Home care    When a joint is more sore than usual, rest it for a day or two.    Heat can help relieve stiffness. Take a hot bath or apply a heating pad for up to 30 minutes at a time. If symptoms are worse in the morning, using heat just after awakening can help relax the muscle and soothe the joints.     Ice helps relieve pain and swelling. It is often used after activity. Use a cold pack wrapped in a thin cloth on the joint for 10 to 15 minutes at a time.     Alternating hot and cold can also help relieve pain. Try this for 20 minutes at a time, several times per day.    Exercise helps prevent the muscles and  ligaments around the joint from becoming weak. It also helps maintain function in the joint.  Be as active as you can. Talk to your healthcare provider about what activity program is best for you.    Excess weight puts a lot of extra strain on weight-bearing joints of the lower back, hips, knees, feet and ankles. If you are overweight, talk to your healthcare provider about a safe and effective weight loss program.    Use anti-inflammatory medicines as prescribed for pain. This includes acetaminophen or NSAIDs such as ibuprofen or naproxen. If needed, topical or injected medicines may be recommended. Talk to your healthcare provider if these options are not enough to manage your pain.    Talk with your healthcare provider about devices that might help improve your function and reduce pain.  Follow-up care  Follow up with your healthcare provider as advised by our staff.  When to seek medical advice  Call your healthcare provider right away if any of these occur:    Redness or swelling of a painful joint    Discharge or pus from a painful joint    Fever of 100.4 F (38 C) or higher, or as directed by your healthcare provider    Worsening joint pain    Decreased ability to move the joint or bear weight on the joint  Date Last Reviewed: 3/1/2017    5647-8129 [employer]. [address] [place] [address]. All rights reserved. This information is not intended as a substitute for professional medical care. Always follow your healthcare professional's instructions.                Follow-ups after your visit        Your next 10 appointments already scheduled     Apr 06, 2018  1:00 PM CDT   (Arrive by 12:45 PM)   MA SCREENING DIGITAL BILATERAL with MGMA1, MG MA TECH   [facility]    [address] [address] [place] [address] [phone]           Do not use any powder, lotion or deodorant under your arms or on your breast. If you do, we will ask you  "to remove it before your exam.  Wear comfortable, two-piece clothing.  If you have any allergies, tell your care team.  Bring any previous mammograms from other facilities or have them mailed to the breast center. Three-dimensional (3D) mammograms are available at [place] locations in [facility], [place], [place], [facility], [place], [place], and [place]. [facility] locations include [place] and [facility] in [place]. Benefits of 3D mammograms include: - Improved rate of cancer detection - Decreases your chance of having to go back for more tests, which means fewer: - \"False-positive\" results (This means that there is an abnormal area but it isn't cancer.) - Invasive testing procedures, such as a biopsy or surgery - Can provide clearer images of the breast if you have dense breast tissue. 3D mammography is an optional exam that anyone can have with a 2D mammogram. It doesn't replace or take the place of a 2D mammogram. 2D mammograms remain an effective screening test for all women.  Not all insurance companies cover the cost of a 3D mammogram. Check with your insurance.              Future tests that were ordered for you today     Open Future Orders        Priority Expected Expires Ordered    Fecal colorectal cancer screen FIT - Future (S+30) Routine 4/18/2018 4/27/2018 3/30/2018            Who to contact     If you have questions or need follow up information about today's clinic visit or your schedule please contact [facility] directly at [phone].  Normal or non-critical lab and imaging results will be communicated to you by MyChart, letter or phone within 4 business days after the clinic has received the results. If you do not hear from us within 7 days, please contact the clinic through [employer] or phone. If you have a critical or abnormal lab result, we will notify you by phone as soon as possible.  Submit refill requests through eigitalt or call your " "pharmacy and they will forward the refill request to us. Please allow 3 business days for your refill to be completed.          Additional Information About Your Visit        MyChart Information     [employer] lets you send messages to your doctor, view your test results, renew your prescriptions, schedule appointments and more. To sign up, go to [email or web address] . Click on \"Log in\" on the left side of the screen, which will take you to the Welcome page. Then click on \"Sign up Now\" on the right side of the page.     You will be asked to enter the access code listed below, as well as some personal information. Please follow the directions to create your username and password.     Your access code is: [email or web address]  Expires: 2018  9:05 AM     Your access code will  in 90 days. If you need help or a new code, please call your [place] clinic or [phone].        Care EveryWhere ID     This is your Care EveryWhere ID. This could be used by other organizations to access your [place] medical records  [id number]        Your Vitals Were     Pulse Temperature Respirations Height Last Period Pulse Oximetry    79 98  F (36.7  C) (Temporal) 16 5' 4\" (1.626 m) 2011 95%    BMI (Body Mass Index)                   26.61 kg/m2            Blood Pressure from Last 3 Encounters:   18 120/70   10/09/17 129/81   17 110/80    Weight from Last 3 Encounters:   18 155 lb (70.3 kg)   17 169 lb (76.7 kg)   17 169 lb (76.7 kg)               Primary Care Provider Office Phone # Fax #    Allan Peña PA-C [phone]       [address]  [facility] [address]        Equal Access to Services     CITLALY LAWSON : Leah Ponce, waessie guy, qaybta kaalnadine mendoza, liane jimenez. [facility] [phone].    ATENCIÓN: Si habla español, tiene a marley disposición servicios gratuitos de asistencia lingüística. Llame al " [phone].    We comply with applicable federal civil rights laws and [place] laws. We do not discriminate on the basis of race, color, national origin, age, disability, sex, sexual orientation, or gender identity.            Thank you!     Thank you for choosing [facility]  for your care. Our goal is always to provide you with excellent care. Hearing back from our patients is one way we can continue to improve our services. Please take a few minutes to complete the written survey that you may receive in the mail after your visit with us. Thank you!             Your Updated Medication List - Protect others around you: Learn how to safely use, store and throw away your medicines at www.disposemymeds.org.          This list is accurate as of 3/30/18  9:05 AM.  Always use your most recent med list.                   Brand Name Dispense Instructions for use Diagnosis    albuterol 108 (90 BASE) MCG/ACT Inhaler    PROAIR HFA/PROVENTIL HFA/VENTOLIN HFA    1 Inhaler    Inhale 2 puffs into the lungs every 6 hours as needed for shortness of breath / dyspnea or wheezing    Acute bronchospasm       escitalopram 10 MG tablet    LEXAPRO    30 tablet    TAKE ONE TABLET BY MOUTH EVERY DAY    Menopausal syndrome (hot flashes)       fluticasone 50 MCG/ACT spray    FLONASE    1 Bottle    Spray 2 sprays into both nostrils daily    Acute URI       MULTIVITAMIN ADULT PO           traMADol 50 MG tablet    ULTRAM    30 tablet    Take 1 tablet (50 mg) by mouth every 6 hours as needed for pain    Post-operative state       * triamcinolone 0.5 % cream    KENALOG    30 g    Apply sparingly to affected area three times daily.    Eczema of both hands       * triamcinolone 0.1 % cream    KENALOG    80 g    Apply sparingly to affected area three times daily for 14 days at a time.    Eczema, unspecified type       * Notice:  This list has 2 medication(s) that are the same as other medications prescribed for you. Read the  directions carefully, and ask your doctor or other care provider to review them with you.

## 2018-04-02 ENCOUNTER — TELEPHONE (OUTPATIENT)
Dept: FAMILY MEDICINE | Facility: OTHER | Age: 58
End: 2018-04-02

## 2018-04-02 NOTE — TELEPHONE ENCOUNTER
----- Message from Allan Peña PA-C sent at 3/30/2018  5:24 PM CDT -----  Please call and notify patient that there is some mild arthritis in the knee but it is not severe. If symptoms are not improving like we discussed, will send to orthopedics.    Allan Peña PA-C

## 2018-04-19 DIAGNOSIS — Z12.11 SCREEN FOR COLON CANCER: ICD-10-CM

## 2018-04-19 LAB — HEMOCCULT STL QL IA: NEGATIVE

## 2018-04-19 PROCEDURE — 82274 ASSAY TEST FOR BLOOD FECAL: CPT | Performed by: PHYSICIAN ASSISTANT

## 2018-04-30 NOTE — PATIENT INSTRUCTIONS
I believe you have arthritis in your knee.  I recommend you continue with ibuprofen, rest, ice, and elevation.  Try to avoid quick movements and prolonged walking and standing.  I recommend a soft knee brace for comfort and stability.   Depending on x-ray results, I may send you to orthopedics.  If pain is worsening or not improving over the next month, let me know and I will send you to orthopedics or potentially order an MRI.    Bring in your FIT testing when you can.      Osteoarthritis  Osteoarthritis (also called degenerative joint disease) happens when the cartilage in a joint becomes damaged and worn. This may be due to age, wear and tear, overuse of the joint, or other problems. Osteoarthritis can affect any joint. But it is most common in hands, knees, spine, hips, and feet. Symptoms include joint stiffness, pain, and swelling.  Home care    When a joint is more sore than usual, rest it for a day or two.    Heat can help relieve stiffness. Take a hot bath or apply a heating pad for up to 30 minutes at a time. If symptoms are worse in the morning, using heat just after awakening can help relax the muscle and soothe the joints.     Ice helps relieve pain and swelling. It is often used after activity. Use a cold pack wrapped in a thin cloth on the joint for 10 to 15 minutes at a time.     Alternating hot and cold can also help relieve pain. Try this for 20 minutes at a time, several times per day.    Exercise helps prevent the muscles and ligaments around the joint from becoming weak. It also helps maintain function in the joint.  Be as active as you can. Talk to your healthcare provider about what activity program is best for you.    Excess weight puts a lot of extra strain on weight-bearing joints of the lower back, hips, knees, feet and ankles. If you are overweight, talk to your healthcare provider about a safe and effective weight loss program.    Use anti-inflammatory medicines as prescribed for pain.  This includes acetaminophen or NSAIDs such as ibuprofen or naproxen. If needed, topical or injected medicines may be recommended. Talk to your healthcare provider if these options are not enough to manage your pain.    Talk with your healthcare provider about devices that might help improve your function and reduce pain.  Follow-up care  Follow up with your healthcare provider as advised by our staff.  When to seek medical advice  Call your healthcare provider right away if any of these occur:    Redness or swelling of a painful joint    Discharge or pus from a painful joint    Fever of 100.4 F (38 C) or higher, or as directed by your healthcare provider    Worsening joint pain    Decreased ability to move the joint or bear weight on the joint  Date Last Reviewed: 3/1/2017    7902-0429 [employer]. [address] [place] [address], [place] [address]. All rights reserved. This information is not intended as a substitute for professional medical care. Always follow your healthcare professional's instructions.         n/a

## 2018-05-08 ENCOUNTER — OFFICE VISIT (OUTPATIENT)
Dept: FAMILY MEDICINE | Facility: OTHER | Age: 58
End: 2018-05-08
Payer: COMMERCIAL

## 2018-05-08 VITALS
HEART RATE: 76 BPM | BODY MASS INDEX: 25.92 KG/M2 | RESPIRATION RATE: 16 BRPM | SYSTOLIC BLOOD PRESSURE: 90 MMHG | OXYGEN SATURATION: 94 % | DIASTOLIC BLOOD PRESSURE: 72 MMHG | TEMPERATURE: 98.8 F | WEIGHT: 151 LBS

## 2018-05-08 DIAGNOSIS — M17.12 PRIMARY OSTEOARTHRITIS OF LEFT KNEE: Primary | ICD-10-CM

## 2018-05-08 DIAGNOSIS — M25.562 ACUTE PAIN OF LEFT KNEE: ICD-10-CM

## 2018-05-08 PROBLEM — G89.29 CHRONIC PAIN OF LEFT KNEE: Status: RESOLVED | Noted: 2018-05-08 | Resolved: 2018-05-08

## 2018-05-08 PROBLEM — G89.29 CHRONIC PAIN OF LEFT KNEE: Status: ACTIVE | Noted: 2018-05-08

## 2018-05-08 PROCEDURE — 99214 OFFICE O/P EST MOD 30 MIN: CPT | Performed by: PHYSICIAN ASSISTANT

## 2018-05-08 RX ORDER — IBUPROFEN 800 MG/1
800 TABLET, FILM COATED ORAL EVERY 8 HOURS PRN
Qty: 60 TABLET | Refills: 1 | Status: SHIPPED | OUTPATIENT
Start: 2018-05-08 | End: 2023-03-20

## 2018-05-08 ASSESSMENT — PAIN SCALES - GENERAL: PAINLEVEL: MILD PAIN (3)

## 2018-05-08 NOTE — MR AVS SNAPSHOT
After Visit Summary   5/8/2018    July Green    MRN: [id number]           Patient Information     Date Of Birth          1960        Visit Information        Provider Department      5/8/2018 10:30 AM Anne Marie Ma PA-C [facility]        Today's Diagnoses     Primary osteoarthritis of left knee    -  1    Acute pain of left knee          Care Instructions    - Follow up with orthopedics     - Ibuprofen 800 mg - three times a day           Follow-ups after your visit        Additional Services     ORTHO  REFERRAL       [facility] is referring you to the Orthopedic  Services at [facility].       The  Representative will assist you in the coordination of your Orthopedic and Musculoskeletal Care as prescribed by your physician.    The  Representative will call you within 1 business day to help schedule your appointment, or you may contact the  Representative at:    All areas ~ [phone]     Type of Referral : Surgical / Specialist       Timeframe requested: Routine    Coverage of these services is subject to the terms and limitations of your health insurance plan.  Please call member services at your health plan with any benefit or coverage questions.      If X-rays, CT or MRI's have been performed, please contact the facility where they were done to arrange for , prior to your scheduled appointment.  Please bring this referral request to your appointment and present it to your specialist.                  Follow-up notes from your care team     Return if symptoms worsen or fail to improve.      Who to contact     If you have questions or need follow up information about today's clinic visit or your schedule please contact [facility] directly at [phone].  Normal or non-critical lab and imaging results will be communicated to you by [place]  "letter or phone within 4 business days after the clinic has received the results. If you do not hear from us within 7 days, please contact the clinic through [employer] or phone. If you have a critical or abnormal lab result, we will notify you by phone as soon as possible.  Submit refill requests through [employer] or call your pharmacy and they will forward the refill request to us. Please allow 3 business days for your refill to be completed.          Additional Information About Your Visit        [employer] Information     [employer] lets you send messages to your doctor, view your test results, renew your prescriptions, schedule appointments and more. To sign up, go to [email or web address] . Click on \"Log in\" on the left side of the screen, which will take you to the Welcome page. Then click on \"Sign up Now\" on the right side of the page.     You will be asked to enter the access code listed below, as well as some personal information. Please follow the directions to create your username and password.     Your access code is: [email or web address]  Expires: 2018  9:05 AM     Your access code will  in 90 days. If you need help or a new code, please call your [facility] clinic or [phone].        Care EveryWhere ID     This is your Care EveryWhere ID. This could be used by other organizations to access your [facility] medical records  [id number]        Your Vitals Were     Pulse Temperature Respirations Last Period Pulse Oximetry BMI (Body Mass Index)    76 98.8  F (37.1  C) (Oral) 16 2011 94% 25.92 kg/m2       Blood Pressure from Last 3 Encounters:   18 90/72   18 120/70   10/09/17 129/81    Weight from Last 3 Encounters:   18 151 lb (68.5 kg)   18 155 lb (70.3 kg)   17 169 lb (76.7 kg)              We Performed the Following     ORTHO  REFERRAL          Today's Medication Changes          These changes are accurate as of 18 10:55 AM.  If you have any questions, ask " your nurse or doctor.               Start taking these medicines.        Dose/Directions    ibuprofen 800 MG tablet   Commonly known as:  ADVIL/MOTRIN   Used for:  Primary osteoarthritis of left knee, Acute pain of left knee   Started by:  Anne Marie Ma PA-C        Dose:  800 mg   Take 1 tablet (800 mg) by mouth every 8 hours as needed for moderate pain   Quantity:  60 tablet   Refills:  1            Where to get your medicines      These medications were sent to [facility] [facility] [address] [facility]  [address] [facility] [address]     Phone:  [phone]     ibuprofen 800 MG tablet                Primary Care Provider Office Phone # Fax #    Allan Giuseppe Peña PA-C [phone]       [address] [place] [address] [address]  [facility] [address]        Equal Access to Services     [facility] Hospital[facility]LEINE : Hadii aad ku hadasho Soortiz, waaxda luqadaha, qaybta kaalmada adestivenyaalfonso, liane alvarado . So [facility] [phone].    ATENCIÓN: Si habla español, tiene a marley disposición servicios gratuitos de asistencia lingüística. Shankar al [phone].    We comply with applicable federal civil rights laws and [place] laws. We do not discriminate on the basis of race, color, national origin, age, disability, sex, sexual orientation, or gender identity.            Thank you!     Thank you for choosing [facility]  for your care. Our goal is always to provide you with excellent care. Hearing back from our patients is one way we can continue to improve our services. Please take a few minutes to complete the written survey that you may receive in the mail after your visit with us. Thank you!             Your Updated Medication List - Protect others around you: Learn how to safely use, store and throw away your medicines at www.disposemymeds.org.          This list is accurate as of 5/8/18 10:55 AM.  Always use your most recent med list.                   Brand  Name Dispense Instructions for use Diagnosis    albuterol 108 (90 Base) MCG/ACT Inhaler    PROAIR HFA/PROVENTIL HFA/VENTOLIN HFA    1 Inhaler    Inhale 2 puffs into the lungs every 6 hours as needed for shortness of breath / dyspnea or wheezing    Acute bronchospasm       fluticasone 50 MCG/ACT spray    FLONASE    1 Bottle    Spray 2 sprays into both nostrils daily    Acute URI       ibuprofen 800 MG tablet    ADVIL/MOTRIN    60 tablet    Take 1 tablet (800 mg) by mouth every 8 hours as needed for moderate pain    Primary osteoarthritis of left knee, Acute pain of left knee

## 2018-05-11 ENCOUNTER — OFFICE VISIT (OUTPATIENT)
Dept: ORTHOPEDICS | Facility: OTHER | Age: 58
End: 2018-05-11
Payer: COMMERCIAL

## 2018-05-11 VITALS
HEIGHT: 64 IN | DIASTOLIC BLOOD PRESSURE: 60 MMHG | WEIGHT: 152 LBS | BODY MASS INDEX: 25.95 KG/M2 | SYSTOLIC BLOOD PRESSURE: 110 MMHG

## 2018-05-11 DIAGNOSIS — S83.242A OTHER TEAR OF MEDIAL MENISCUS OF LEFT KNEE AS CURRENT INJURY, INITIAL ENCOUNTER: Primary | ICD-10-CM

## 2018-05-11 PROCEDURE — 99243 OFF/OP CNSLTJ NEW/EST LOW 30: CPT | Mod: 25 | Performed by: ORTHOPAEDIC SURGERY

## 2018-05-11 PROCEDURE — 20610 DRAIN/INJ JOINT/BURSA W/O US: CPT | Mod: LT | Performed by: ORTHOPAEDIC SURGERY

## 2018-05-11 ASSESSMENT — PAIN SCALES - GENERAL: PAINLEVEL: MODERATE PAIN (5)

## 2018-05-11 NOTE — PATIENT INSTRUCTIONS
Knee Pain with Possible Torn Meniscus    The meniscus is a tough cartilage pad that cushions the inside of the knee joint. It helps absorb the shock from movement. It also spreads the weight of your body evenly across the knee joint. This prevents excess wear and tear to the bones of that joint.  The most common causes of meniscal tears are injuries, especially related to sports and degenerative disease that happens with aging.  A meniscus tear commonly happens during a twisting injury when the knee is bent. This causes pain, swelling, reduced movement of the knee, and trouble walking. There may be popping, clicking, joint locking or inability to completely straighten the knee. Ligaments of the knee may also be injured.  A torn meniscus is diagnosed by physical exam and X-rays. In the case of a severe injury, the knee may be too painful to examine fully. A more accurate exam can be done after the initial swelling goes down. An MRI may be ordered to make a final diagnosis.  If your healthcare provider suspects a meniscal injury, you will treat your knee with ice and rest and preventing movement of the knee. A splint or knee brace that keeps your leg straight may be put on to protect the joint. Depending on the severity of the injury, surgery may be needed. A cartilage injury may take 4-12 weeks to heal depending on how bad it is.  Home care    Stay off the injured leg as much as possible until you can walk on it without pain. If you have a lot of pain when walking, crutches or a walker may be prescribed. (These can be rented or bought at many pharmacies and surgical or orthopedic supply stores). Follow your healthcare provider's advice about when to begin putting weight on that leg.    Keep your leg elevated to reduce pain and swelling. When sleeping, place a pillow under the injured leg. When sitting, support the injured leg so it is level with your waist. This is very important during the first 48 hours.    Apply  an ice pack over the injured area for 15 to 20 minutes every 3 to 6 hours. You should do this for the first 24 to 48 hours. You can make an ice pack by filling a plastic bag that seals at the top with ice cubes and then wrapping it with a thin towel. Continue to use ice packs for relief of pain and swelling as needed. As the ice melts, be careful to avoid getting your wrap, splint, or cast wet. After 48 hours, apply heat (warm shower or warm bath) for 15 to 20 minutes several times a day, or alternate ice and heat. You can place the ice pack directly over the splint. If you have to wear a hook-and-loop knee brace, you can open it to apply the ice pack, or heat, directly to the knee. Never put ice directly on the skin. Always wrap the ice in a towel or other type of cloth.    You may use over-the-counter pain medicine to control pain, unless another pain medicine was prescribed. If you have chronic liver or kidney disease or ever had a stomach ulcer, talk with your healthcare provider before using these medicines.    If you were given a splint, keep it dry at all times. Bathe with your splint out of the water. Protect it with a large plastic bag that is rubber-banded at the top end. If a fiberglass splint gets wet, you can dry it with a hair dryer. If you have a hook-and-loop knee brace, you can remove this to bathe, unless told otherwise.    Check with your healthcare provider before returning to sports or full work duties.  Follow-up care  Follow up with your healthcare provider, or as advised. This is usually within 1 to 2 weeks. Further testing may be required to check the extent of your injury.  If X-rays were taken, you will be told of any new findings that may affect your care.  Call 911  Call 911 if you have:     Shortness of breath    Chest pain  When to seek medical advice  Call your healthcare provider right away if any of these occur:    Toes or foot gets swollen, cold, blue, numb, or tingly    Pain or  swelling spreads over the knee or calf    Warmth or redness appears over the knee or calf    Fever of 100.4 F (38 C) or higher, or as directed by your healthcare provider  Date Last Reviewed: 11/23/2015 2000-2017 [employer]. [address] [place] [address]. All rights reserved. This information is not intended as a substitute for professional medical care. Always follow your healthcare professional's instructions.        Treating Meniscus Problems  The type of surgery you have depends on the nature of your tear, its size, and location. Your surgeon may use arthroscopy. This method involves putting a tiny camera inside your knee, so that your healthcare provider can clearly see your joint. Arthroscopy requires only small incisions (cuts). You can usually go home the same day as surgery. During surgery, you may have:    Local anesthesia. Your healthcare provider numbs your knee with medicine.     A regional block. Your body is numbed from the waist down.    General anesthesia. Your healthcare provider gives you drugs to put you into a deep sleep so you do not feel pain.  Pre-op checklist    Don't eat or drink 10 hours before surgery.    Arrange for someone to drive you home after surgery.    Tell your healthcare provider if you take any medicine, supplements, or herbal remedies.        Repair  For certain tears, your surgeon will try to repair the meniscus. He or she will sew the torn edges so they can heal properly. Or your surgeon will use special fasteners to repair damage. In some cases, repairs may require another incision at the back or side of your knee.       Removal  In most cases, your surgeon will remove the damaged part of your meniscus. The meniscus won't completely grow back, so your surgeon will remove as little tissue as possible. Other tissue, called the articular cartilage, will take over the role as shock absorber for your knee joint.       After surgery  You'll spend  some time in the recovery area. You can go home when you've recovered from the anesthesia. Your knee will be bandaged. You may have stitches, steri-strips, or staples. You may need crutches to keep weight off the knee and may have a splint for support.  Date Last Reviewed: 1/1/2018 2000-2017 [employer]. [address] [place] [address]. All rights reserved. This information is not intended as a substitute for professional medical care. Always follow your healthcare professional's instructions.        Quad Set for Leg and Knee    This exercise is designed to stretch and strengthen your knee. Before beginning, read through all the instructions. While exercising, breathe normally and use smooth movements. If you feel any pain, stop the exercise. If pain persists, call your healthcare provider.  1.  Sit on the floor with one leg straight, the other bent.  2.  Flex the foot of your straight leg by pointing your toes toward you. Press the back of your knee into the floor while tightening the muscle on the top of your thigh. Hold for ______ seconds. Then relax.  3.  Repeat ______ times. Do ______ sets a day.  Caution    Don t arch your back.    Don t hunch your shoulders.  Date Last Reviewed: 9/20/2015 2000-2017 [employer]. [address] [place] [address]. All rights reserved. This information is not intended as a substitute for professional medical care. Always follow your healthcare professional's instructions.        Knee Rehabilitation: Straight Leg Raises    Begin your rehabilitation with exercises that develop muscle control. These help you meet basic goals, like driving a car or going back to work. Exercise as often as you re advised. But stop right away if any exercise causes sharp or increasing pain. Icing your knee for 15 to 20 minutes after exercise can help prevent swelling and soreness.    Sit or lie on the floor with your injured leg straight and the  other leg bent. Point the toes on your injured leg straight up.    Raise your injured leg a few inches off the floor.    Hold for 10 seconds. Repeat 5 times. Rest for a minute, and then do another set. Do 2 to 3 sessions per day.  Note: Do this exercise with toes turned out to strengthen inner thigh muscles.  Date Last Reviewed: 8/16/2015 2000-2017 [employer]. [address] [place] [address], [place] [id number]. All rights reserved. This information is not intended as a substitute for professional medical care. Always follow your healthcare professional's instructions.
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
## 2018-05-11 NOTE — MR AVS SNAPSHOT
After Visit Summary   5/11/2018    July Green    MRN: [id number]           Patient Information     Date Of Birth          1960        Visit Information        Provider Department      5/11/2018 2:40 PM Latonia Alvarado MD [facility]        Today's Diagnoses     Other tear of medial meniscus of left knee as current injury, initial encounter    -  1      Care Instructions      Knee Pain with Possible Torn Meniscus    The meniscus is a tough cartilage pad that cushions the inside of the knee joint. It helps absorb the shock from movement. It also spreads the weight of your body evenly across the knee joint. This prevents excess wear and tear to the bones of that joint.  The most common causes of meniscal tears are injuries, especially related to sports and degenerative disease that happens with aging.  A meniscus tear commonly happens during a twisting injury when the knee is bent. This causes pain, swelling, reduced movement of the knee, and trouble walking. There may be popping, clicking, joint locking or inability to completely straighten the knee. Ligaments of the knee may also be injured.  A torn meniscus is diagnosed by physical exam and X-rays. In the case of a severe injury, the knee may be too painful to examine fully. A more accurate exam can be done after the initial swelling goes down. An MRI may be ordered to make a final diagnosis.  If your healthcare provider suspects a meniscal injury, you will treat your knee with ice and rest and preventing movement of the knee. A splint or knee brace that keeps your leg straight may be put on to protect the joint. Depending on the severity of the injury, surgery may be needed. A cartilage injury may take 4-12 weeks to heal depending on how bad it is.  Home care    Stay off the injured leg as much as possible until you can walk on it without pain. If you have a lot of pain when walking, crutches or a walker may be  prescribed. (These can be rented or bought at many pharmacies and surgical or orthopedic supply stores). Follow your healthcare provider's advice about when to begin putting weight on that leg.    Keep your leg elevated to reduce pain and swelling. When sleeping, place a pillow under the injured leg. When sitting, support the injured leg so it is level with your waist. This is very important during the first 48 hours.    Apply an ice pack over the injured area for 15 to 20 minutes every 3 to 6 hours. You should do this for the first 24 to 48 hours. You can make an ice pack by filling a plastic bag that seals at the top with ice cubes and then wrapping it with a thin towel. Continue to use ice packs for relief of pain and swelling as needed. As the ice melts, be careful to avoid getting your wrap, splint, or cast wet. After 48 hours, apply heat (warm shower or warm bath) for 15 to 20 minutes several times a day, or alternate ice and heat. You can place the ice pack directly over the splint. If you have to wear a hook-and-loop knee brace, you can open it to apply the ice pack, or heat, directly to the knee. Never put ice directly on the skin. Always wrap the ice in a towel or other type of cloth.    You may use over-the-counter pain medicine to control pain, unless another pain medicine was prescribed. If you have chronic liver or kidney disease or ever had a stomach ulcer, talk with your healthcare provider before using these medicines.    If you were given a splint, keep it dry at all times. Bathe with your splint out of the water. Protect it with a large plastic bag that is rubber-banded at the top end. If a fiberglass splint gets wet, you can dry it with a hair dryer. If you have a hook-and-loop knee brace, you can remove this to bathe, unless told otherwise.    Check with your healthcare provider before returning to sports or full work duties.  Follow-up care  Follow up with your healthcare provider, or as  advised. This is usually within 1 to 2 weeks. Further testing may be required to check the extent of your injury.  If X-rays were taken, you will be told of any new findings that may affect your care.  Call 911  Call 911 if you have:     Shortness of breath    Chest pain  When to seek medical advice  Call your healthcare provider right away if any of these occur:    Toes or foot gets swollen, cold, blue, numb, or tingly    Pain or swelling spreads over the knee or calf    Warmth or redness appears over the knee or calf    Fever of 100.4 F (38 C) or higher, or as directed by your healthcare provider  Date Last Reviewed: 11/23/2015 2000-2017 [employer]. [address] [place] [address], [place] [address]. All rights reserved. This information is not intended as a substitute for professional medical care. Always follow your healthcare professional's instructions.        Treating Meniscus Problems  The type of surgery you have depends on the nature of your tear, its size, and location. Your surgeon may use arthroscopy. This method involves putting a tiny camera inside your knee, so that your healthcare provider can clearly see your joint. Arthroscopy requires only small incisions (cuts). You can usually go home the same day as surgery. During surgery, you may have:    Local anesthesia. Your healthcare provider numbs your knee with medicine.     A regional block. Your body is numbed from the waist down.    General anesthesia. Your healthcare provider gives you drugs to put you into a deep sleep so you do not feel pain.  Pre-op checklist    Don't eat or drink 10 hours before surgery.    Arrange for someone to drive you home after surgery.    Tell your healthcare provider if you take any medicine, supplements, or herbal remedies.        Repair  For certain tears, your surgeon will try to repair the meniscus. He or she will sew the torn edges so they can heal properly. Or your surgeon will use special fasteners to  repair damage. In some cases, repairs may require another incision at the back or side of your knee.       Removal  In most cases, your surgeon will remove the damaged part of your meniscus. The meniscus won't completely grow back, so your surgeon will remove as little tissue as possible. Other tissue, called the articular cartilage, will take over the role as shock absorber for your knee joint.       After surgery  You'll spend some time in the recovery area. You can go home when you've recovered from the anesthesia. Your knee will be bandaged. You may have stitches, steri-strips, or staples. You may need crutches to keep weight off the knee and may have a splint for support.  Date Last Reviewed: 1/1/2018 2000-2017 [employer]. [address] [place] [address]. All rights reserved. This information is not intended as a substitute for professional medical care. Always follow your healthcare professional's instructions.        Quad Set for Leg and Knee    This exercise is designed to stretch and strengthen your knee. Before beginning, read through all the instructions. While exercising, breathe normally and use smooth movements. If you feel any pain, stop the exercise. If pain persists, call your healthcare provider.  1.  Sit on the floor with one leg straight, the other bent.  2.  Flex the foot of your straight leg by pointing your toes toward you. Press the back of your knee into the floor while tightening the muscle on the top of your thigh. Hold for ______ seconds. Then relax.  3.  Repeat ______ times. Do ______ sets a day.  Caution    Don t arch your back.    Don t hunch your shoulders.  Date Last Reviewed: 9/20/2015 2000-2017 [employer]. [address] [place] [address]. All rights reserved. This information is not intended as a substitute for professional medical care. Always follow your healthcare professional's instructions.        Knee Rehabilitation:  Straight Leg Raises    Begin your rehabilitation with exercises that develop muscle control. These help you meet basic goals, like driving a car or going back to work. Exercise as often as you re advised. But stop right away if any exercise causes sharp or increasing pain. Icing your knee for 15 to 20 minutes after exercise can help prevent swelling and soreness.    Sit or lie on the floor with your injured leg straight and the other leg bent. Point the toes on your injured leg straight up.    Raise your injured leg a few inches off the floor.    Hold for 10 seconds. Repeat 5 times. Rest for a minute, and then do another set. Do 2 to 3 sessions per day.  Note: Do this exercise with toes turned out to strengthen inner thigh muscles.  Date Last Reviewed: 8/16/2015 2000-2017 [employer]. [address] [place] [address], [place] [address]. All rights reserved. This information is not intended as a substitute for professional medical care. Always follow your healthcare professional's instructions.                Follow-ups after your visit        Who to contact     If you have questions or need follow up information about today's clinic visit or your schedule please contact [facility] directly at [phone].  Normal or non-critical lab and imaging results will be communicated to you by MyChart, letter or phone within 4 business days after the clinic has received the results. If you do not hear from us within 7 days, please contact the clinic through [employer] or phone. If you have a critical or abnormal lab result, we will notify you by phone as soon as possible.  Submit refill requests through [employer] or call your pharmacy and they will forward the refill request to us. Please allow 3 business days for your refill to be completed.          Additional Information About Your Visit        [employer] Information     [employer] lets you send messages to your doctor, view your test results, renew your  "prescriptions, schedule appointments and more. To sign up, go to [email or web address] . Click on \"Log in\" on the left side of the screen, which will take you to the Welcome page. Then click on \"Sign up Now\" on the right side of the page.     You will be asked to enter the access code listed below, as well as some personal information. Please follow the directions to create your username and password.     Your access code is: [email or web address]  Expires: 2018  9:05 AM     Your access code will  in 90 days. If you need help or a new code, please call your [place] clinic or [phone].        Care EveryWhere ID     This is your Care EveryWhere ID. This could be used by other organizations to access your [place] medical records  [id number]        Your Vitals Were     Height Last Period BMI (Body Mass Index)             1.626 m (5' 4\") 2011 26.09 kg/m2          Blood Pressure from Last 3 Encounters:   18 110/60   18 90/72   18 120/70    Weight from Last 3 Encounters:   18 68.9 kg (152 lb)   18 68.5 kg (151 lb)   18 70.3 kg (155 lb)              Today, you had the following     No orders found for display       Primary Care Provider Office Phone # Fax #    Allan Peña PA-C [phone]       [address] [place] [address]        Equal Access to Services     [facility]: Hadii aad ku hadasho Soortiz, waaxda luqadaha, qaybta kaalmada kelly, liane alvarado . So [facility] [phone].    ATENCIÓN: Si habla español, tiene a marley disposición servicios gratuitos de asistencia lingüística. Llame al [phone].    We comply with applicable federal civil rights laws and [place] laws. We do not discriminate on the basis of race, color, national origin, age, disability, sex, sexual orientation, or gender identity.            Thank you!     Thank you for choosing [facility]  for your care. Our goal " is always to provide you with excellent care. Hearing back from our patients is one way we can continue to improve our services. Please take a few minutes to complete the written survey that you may receive in the mail after your visit with us. Thank you!             Your Updated Medication List - Protect others around you: Learn how to safely use, store and throw away your medicines at www.disposemymeds.org.          This list is accurate as of 5/11/18  3:10 PM.  Always use your most recent med list.                   Brand Name Dispense Instructions for use Diagnosis    fluticasone 50 MCG/ACT spray    FLONASE    1 Bottle    Spray 2 sprays into both nostrils daily    Acute URI       ibuprofen 800 MG tablet    ADVIL/MOTRIN    60 tablet    Take 1 tablet (800 mg) by mouth every 8 hours as needed for moderate pain    Primary osteoarthritis of left knee, Acute pain of left knee

## 2018-05-21 ENCOUNTER — THERAPY VISIT (OUTPATIENT)
Dept: PHYSICAL THERAPY | Facility: CLINIC | Age: 58
End: 2018-05-21
Payer: COMMERCIAL

## 2018-05-21 DIAGNOSIS — M25.562 ACUTE PAIN OF LEFT KNEE: Primary | ICD-10-CM

## 2018-05-21 PROCEDURE — 97140 MANUAL THERAPY 1/> REGIONS: CPT | Mod: GP | Performed by: PHYSICAL THERAPIST

## 2018-05-21 PROCEDURE — 97110 THERAPEUTIC EXERCISES: CPT | Mod: GP | Performed by: PHYSICAL THERAPIST

## 2018-05-21 PROCEDURE — 97161 PT EVAL LOW COMPLEX 20 MIN: CPT | Mod: GP | Performed by: PHYSICAL THERAPIST

## 2018-05-21 ASSESSMENT — ACTIVITIES OF DAILY LIVING (ADL)
WEAKNESS: THE SYMPTOM AFFECTS MY ACTIVITY SLIGHTLY
SQUAT: ACTIVITY IS FAIRLY DIFFICULT
HOW_WOULD_YOU_RATE_THE_OVERALL_FUNCTION_OF_YOUR_KNEE_DURING_YOUR_USUAL_DAILY_ACTIVITIES?: ABNORMAL
STIFFNESS: I HAVE THE SYMPTOM BUT IT DOES NOT AFFECT MY ACTIVITY
KNEE_ACTIVITY_OF_DAILY_LIVING_SCORE: 61.43
RAW_SCORE: 43
HOW_WOULD_YOU_RATE_THE_CURRENT_FUNCTION_OF_YOUR_KNEE_DURING_YOUR_USUAL_DAILY_ACTIVITIES_ON_A_SCALE_FROM_0_TO_100_WITH_100_BEING_YOUR_LEVEL_OF_KNEE_FUNCTION_PRIOR_TO_YOUR_INJURY_AND_0_BEING_THE_INABILITY_TO_PERFORM_ANY_OF_YOUR_USUAL_DAILY_ACTIVITIES?: 17
GO UP STAIRS: ACTIVITY IS SOMEWHAT DIFFICULT
KNEE_ACTIVITY_OF_DAILY_LIVING_SUM: 43
AS_A_RESULT_OF_YOUR_KNEE_INJURY,_HOW_WOULD_YOU_RATE_YOUR_CURRENT_LEVEL_OF_DAILY_ACTIVITY?: ABNORMAL
SIT WITH YOUR KNEE BENT: ACTIVITY IS NOT DIFFICULT
WALK: ACTIVITY IS FAIRLY DIFFICULT
RISE FROM A CHAIR: ACTIVITY IS MINIMALLY DIFFICULT
GO DOWN STAIRS: ACTIVITY IS SOMEWHAT DIFFICULT
GIVING WAY, BUCKLING OR SHIFTING OF KNEE: THE SYMPTOM AFFECTS MY ACTIVITY MODERATELY
KNEEL ON THE FRONT OF YOUR KNEE: ACTIVITY IS SOMEWHAT DIFFICULT
SWELLING: I HAVE THE SYMPTOM BUT IT DOES NOT AFFECT MY ACTIVITY
STAND: ACTIVITY IS MINIMALLY DIFFICULT
LIMPING: THE SYMPTOM AFFECTS MY ACTIVITY MODERATELY
PAIN: THE SYMPTOM AFFECTS MY ACTIVITY MODERATELY

## 2018-05-21 NOTE — MR AVS SNAPSHOT
"              After Visit Summary   5/21/2018    July Green    MRN: [id number]           Patient Information     Date Of Birth          1960        Visit Information        Provider Department      5/21/2018 7:00 AM Jarad Hill, PT [facility]        Today's Diagnoses     Acute pain of left knee    -  1       Follow-ups after your visit        Your next 10 appointments already scheduled     Jun 01, 2018  3:30 PM CDT   CANDELARIA Extremity with Jarad Hill PT   [facility] ([facility]  )    [address]  [facility] [id number] [phone]            Jun 08, 2018  3:30 PM CDT   CANDELARIA Extremity with Jarad Hill PT   [facility] ([facility]  )    [address]  [facility] [id number] [phone]              Who to contact     If you have questions or need follow up information about today's clinic visit or your schedule please contact [facility] directly at [phone].  Normal or non-critical lab and imaging results will be communicated to you by MyChart, letter or phone within 4 business days after the clinic has received the results. If you do not hear from us within 7 days, please contact the clinic through MyChart or phone. If you have a critical or abnormal lab result, we will notify you by phone as soon as possible.  Submit refill requests through [employer] or call your pharmacy and they will forward the refill request to us. Please allow 3 business days for your refill to be completed.          Additional Information About Your Visit        MyChart Information     [employer] lets you send messages to your doctor, view your test results, renew your prescriptions, schedule appointments and more. To sign up, go to [email or web address] . Click on \"Log in\" on " "the left side of the screen, which will take you to the Welcome page. Then click on \"Sign up Now\" on the right side of the page.     You will be asked to enter the access code listed below, as well as some personal information. Please follow the directions to create your username and password.     Your access code is: [email or web address]  Expires: 2018  9:05 AM     Your access code will  in 90 days. If you need help or a new code, please call your [place] clinic or [phone].        Care EveryWhere ID     This is your Care EveryWhere ID. This could be used by other organizations to access your [place] medical records  [id number]        Your Vitals Were     Last Period                   2011            Blood Pressure from Last 3 Encounters:   18 110/60   18 90/72   18 120/70    Weight from Last 3 Encounters:   18 68.9 kg (152 lb)   18 68.5 kg (151 lb)   18 70.3 kg (155 lb)              We Performed the Following     HC PT EVAL, LOW COMPLEXITY     CANDELARIA INITIAL EVAL REPORT     MANUAL THER TECH,1+REGIONS,EA 15 MIN     THERAPEUTIC EXERCISES        Primary Care Provider Office Phone # Fax #    Allan Peña PA-C [phone]       [address] [place] [address]        Equal Access to Services     [facility] : Hadii aad ku hadasho Soyasminali, waaxda luqadaha, qaybta kaalmada kelly, liane alvarado . So [facility] [phone].    ATENCIÓN: Si habla español, tiene a marley disposición servicios gratuitos de asistencia lingüística. Shankar al [phone].    We comply with applicable federal civil rights laws and [place] laws. We do not discriminate on the basis of race, color, national origin, age, disability, sex, sexual orientation, or gender identity.            Thank you!     Thank you for choosing [place] [facility] [place] [facility]  for your care. Our goal is always to provide you with " excellent care. Hearing back from our patients is one way we can continue to improve our services. Please take a few minutes to complete the written survey that you may receive in the mail after your visit with us. Thank you!             Your Updated Medication List - Protect others around you: Learn how to safely use, store and throw away your medicines at www.disposemymeds.org.          This list is accurate as of 5/21/18  7:48 AM.  Always use your most recent med list.                   Brand Name Dispense Instructions for use Diagnosis    fluticasone 50 MCG/ACT spray    FLONASE    1 Bottle    Spray 2 sprays into both nostrils daily    Acute URI       ibuprofen 800 MG tablet    ADVIL/MOTRIN    60 tablet    Take 1 tablet (800 mg) by mouth every 8 hours as needed for moderate pain    Primary osteoarthritis of left knee, Acute pain of left knee

## 2018-05-21 NOTE — PROGRESS NOTES
"[facility] Initial Evaluation  Subjective:  Patient is a 57 year old female presenting with rehab left knee hpi. The history is provided by the patient. No  was used.   July Green is a 57 year old female with a left knee condition.      This is a new condition  Has been dealing with L medial knee pain since March 18th. The pain started with moving sit<>stand.  She felt a \"crack\" in her knee and then developed pain and swelling in medial knee.  The pain caused her to limp right away.  The pain increase with activity (ie. Walking, stairs, squatting, moving sit<>stand).  Now has been feeling better in the last couple of days.  She was moving her lower leg in IR/ER of tibia with knee flex/ext.  She got some \"popping\" in her knee and then started to feel better..    Patient reports pain:  Medial and posterior.  Radiates to:  Lower leg.  Pain is described as burning, sharp and aching and is intermittent and reported as 9/10.  Associated symptoms:  Catching and edema. Pain is the same all the time.  Symptoms are exacerbated by activity, descending stairs, kneeling, ascending stairs, certain positions, walking and bending/squatting and relieved by rest.  Since onset symptoms are gradually improving.        General health as reported by patient is good.  Pertinent medical history includes:  Osteoarthritis, overweight and menopausal.  Medical allergies: yes (pain meds).  Other surgeries include:  Other (tubal ligation, cyst removal, carpal tunnel).  Current medications:  Anti-inflammatory.  Current occupation is Deli assistant    Pt goal: return to normal walking routine  .  Patient is working in normal job without restrictions.  Primary job tasks include:  Repetitive tasks, prolonged standing and lifting.    Barriers include:  Stairs.    Red flags:  None as reported by the patient.                        Objective:  System    Physical Exam    General     ROS  July HALE" "Peter , : 1960, MRN: [id number]    Physical Therapy Objective Findings  Subjective information, goals, clinical impression, daily documentation and other information found in EPISODES tab.  Knee Objective Findings    Posture:    Gait:  Antalgic L, poor push off L, decreased stride length R    Foot Position in Standing:  Mild pes planus      Hip Screen: + FADIR L, + SERG L, mild pain resisted hip abd L    Range of Motion:                                    Right AROM            Right PROM Left AROM              Left PROM                Extension wnl wnl wnl wnl   Flexion wnl wnl wnl wnl   Other:         Manual Muscle Testing  (graded 0-5, measured at 0 degrees unless otherwise noted):                                                                       Right                                                  Left                                                      Flexion 4+          4+   Extension 5 5   Hip Abduction 3 3   Quad Set     VMO     Other: glute max 3 3   (+ mild pain, ++ moderate pain, +++ severe pain)    Edema:                                                                        Right                                                  Left                                                       Bakers cyst Bakers cyst       Special Tests:                                                                    Right                                                   Left                                                      Step Test Height 4\" 4\"         -Control Comment Mild medial collapse Mod medial collapse with pain   Functional Squat (deg.) 100 100   Lachmans - -   Posterior Sag - -   Anterior Drawer - -   Posterior Drawer - -   Varus at 0 & 30 - -   Valgus at 0 & 30 - -   Marianne's - -   Apley's Distraction     Apley's Compression     External Rotation Test - -   OTHER: bounce home  Knee hyperflexion -  + -  +     Flexibility:                                                                    " Right                                                   Left                                                      Gastroc vicente normal   Soleus     Hamstrings normal normal   Hip Flexors     Quadricep normal normal   ITB Normal  normal          Joint Mobility                                                                       Right                                                   Left                                                       Patellar Static Position normal normal   Patellar Passive Mobility normal normal   Patellar Dynamic Mobility Mild lateral tracking Mild lateral tracking   Proximal Tib-fib     Tibiofemoral            Palpation: tender L glute med tendon, pain L knee medial jt line    Assessment/Plan:    Patient is a 57 year old female with left side knee complaints.    Patient has the following significant findings with corresponding treatment plan.                Diagnosis 1:  L knee pain consistent with meniscus tear  Pain -  hot/cold therapy, manual therapy, self management, education and home program  Decreased strength - therapeutic exercise, therapeutic activities and home program  Decreased function - therapeutic activities and home program    Therapy Evaluation Codes:   1) History comprised of:   Personal factors that impact the plan of care:      Age and Past/current experiences.    Comorbidity factors that impact the plan of care are:      Osteoarthritis and Overweight.     Medications impacting care: Anti-inflammatory.  2) Examination of Body Systems comprised of:   Body structures and functions that impact the plan of care:      Knee.   Activity limitations that impact the plan of care are:      Squatting/kneeling, Stairs, Standing, Walking and Working.  3) Clinical presentation characteristics are:   Stable/Uncomplicated.  4) Decision-Making    Low complexity using standardized patient assessment instrument and/or measureable assessment of functional outcome.  Cumulative Therapy  Evaluation is: Low complexity.    Previous and current functional limitations:  (See Goal Flow Sheet for this information)    Short term and Long term goals: (See Goal Flow Sheet for this information)     Communication ability:  Patient appears to be able to clearly communicate and understand verbal and written communication and follow directions correctly.  Treatment Explanation - The following has been discussed with the patient:   RX ordered/plan of care  Anticipated outcomes  Possible risks and side effects  This patient would benefit from PT intervention to resume normal activities.   Rehab potential is good.    Frequency:  1 X week, once daily  Duration:  for 6 weeks  Discharge Plan:  Achieve all LTG.  Independent in home treatment program.  Reach maximal therapeutic benefit.    Please refer to the daily flowsheet for treatment today, total treatment time and time spent performing 1:1 timed codes.     Jarad Hill,PT, DPT, OCS

## 2018-06-08 ENCOUNTER — THERAPY VISIT (OUTPATIENT)
Dept: PHYSICAL THERAPY | Facility: CLINIC | Age: 58
End: 2018-06-08
Payer: COMMERCIAL

## 2018-06-08 DIAGNOSIS — M25.562 ACUTE PAIN OF LEFT KNEE: ICD-10-CM

## 2018-06-08 PROCEDURE — 97110 THERAPEUTIC EXERCISES: CPT | Mod: GP | Performed by: PHYSICAL THERAPIST

## 2018-06-08 PROCEDURE — 97112 NEUROMUSCULAR REEDUCATION: CPT | Mod: GP | Performed by: PHYSICAL THERAPIST

## 2018-06-08 PROCEDURE — 97140 MANUAL THERAPY 1/> REGIONS: CPT | Mod: GP | Performed by: PHYSICAL THERAPIST

## 2018-06-08 NOTE — MR AVS SNAPSHOT
"              After Visit Summary   2018    July Green    MRN: [id number]           Patient Information     Date Of Birth          1960        Visit Information        Provider Department      2018 3:30 PM Jarad Hill PT [facility]        Today's Diagnoses     Acute pain of left knee           Follow-ups after your visit        Who to contact     If you have questions or need follow up information about today's clinic visit or your schedule please contact [facility] directly at [phone].  Normal or non-critical lab and imaging results will be communicated to you by Plumzihart, letter or phone within 4 business days after the clinic has received the results. If you do not hear from us within 7 days, please contact the clinic through Plumzihart or phone. If you have a critical or abnormal lab result, we will notify you by phone as soon as possible.  Submit refill requests through Digital Vault or call your pharmacy and they will forward the refill request to us. Please allow 3 business days for your refill to be completed.          Additional Information About Your Visit        MyChart Information     Digital Vault lets you send messages to your doctor, view your test results, renew your prescriptions, schedule appointments and more. To sign up, go to [email or web address] . Click on \"Log in\" on the left side of the screen, which will take you to the Welcome page. Then click on \"Sign up Now\" on the right side of the page.     You will be asked to enter the access code listed below, as well as some personal information. Please follow the directions to create your username and password.     Your access code is: [email or web address]  Expires: 2018  9:05 AM     Your access code will  in 90 days. If you need help or a new code, please call your [facility] clinic or [phone].        Care EveryWhere ID  "    This is your Care EveryWhere ID. This could be used by other organizations to access your [facility] medical records  [id number]        Your Vitals Were     Last Period                   05/12/2011            Blood Pressure from Last 3 Encounters:   05/11/18 110/60   05/08/18 90/72   03/30/18 120/70    Weight from Last 3 Encounters:   05/11/18 68.9 kg (152 lb)   05/08/18 68.5 kg (151 lb)   03/30/18 70.3 kg (155 lb)              We Performed the Following     MANUAL THER TECH,1+REGIONS,EA 15 MIN     NEUROMUSCULAR RE-EDUCATION     THERAPEUTIC EXERCISES        Primary Care Provider Office Phone # Fax #    Allan Peña PA-C [phone]       [address] [address]        Equal Access to Services     [facility] LULU : Hadii aad ku hadasho Soortiz, waaxda luqadaha, qaybta kaalmada adeegyada, liane alvarado . So [facility] [phone].    ATENCIÓN: Si habla español, tiene a marley disposición servicios gratuitos de asistencia lingüística. Shankar al [phone].    We comply with applicable federal civil rights laws and [place] laws. We do not discriminate on the basis of race, color, national origin, age, disability, sex, sexual orientation, or gender identity.            Thank you!     Thank you for choosing [facility]  for your care. Our goal is always to provide you with excellent care. Hearing back from our patients is one way we can continue to improve our services. Please take a few minutes to complete the written survey that you may receive in the mail after your visit with us. Thank you!             Your Updated Medication List - Protect others around you: Learn how to safely use, store and throw away your medicines at www.disposemymeds.org.          This list is accurate as of 6/8/18  4:16 PM.  Always use your most recent med list.                   Brand Name Dispense Instructions for use Diagnosis     fluticasone 50 MCG/ACT spray    FLONASE    1 Bottle    Spray 2 sprays into both nostrils daily    Acute URI       ibuprofen 800 MG tablet    ADVIL/MOTRIN    60 tablet    Take 1 tablet (800 mg) by mouth every 8 hours as needed for moderate pain    Primary osteoarthritis of left knee, Acute pain of left knee

## 2018-06-13 ENCOUNTER — TELEPHONE (OUTPATIENT)
Dept: FAMILY MEDICINE | Facility: OTHER | Age: 58
End: 2018-06-13

## 2018-06-13 NOTE — TELEPHONE ENCOUNTER
Panel Management Review      Patient has the following on her problem list: None      Composite cancer screening  Chart review shows that this patient is due/due soon for the following Mammogram  Summary:    Patient is due/failing the following:   MAMMOGRAM    Action needed:   Schedule a mammogram    Type of outreach:    Phone, spoke to patient.  patient declines at this time will call back to schedule when able.     Questions for provider review:    None                                                                                                                                    Ambika Yen     Chart routed to Care Team .

## 2018-06-13 NOTE — LETTER
[facility]  [address]   [place] [address]  Phone: [phone]  August 9, 2018      July Green  [address]   [place] [address]      Dear July,    We care about your health and have reviewed your health plan including your medical conditions, medications, and lab results.  Based on this review, it is recommended that you follow up regarding the following health topic(s):  -Breast Cancer Screening    We recommend you take the following action(s):  -schedule a MAMMOGRAM which is due. Please disregard this reminder if you have had this exam elsewhere within the last 1-2 years please let us know so we can update your records.     Please call us at the [facility] - [phone] (or use Landis[employer]Gyr) to address the above recommendations.     Thank you for trusting [facility] and we appreciate the opportunity to serve you.  We look forward to supporting your healthcare needs in the future.    Healthy Regards,    Your Health Care Team  [facility]

## 2018-08-31 NOTE — PROGRESS NOTES
SUBJECTIVE:   July Green is a 57 year old female who presents to clinic today for the following health issues:      HPI     Concern - bumps (lipoma) on top of right foot  Onset: x 1 year    Description:   2 bumps on bony area on right foot    Intensity: moderate    Progression of Symptoms:  worsening    Accompanying Signs & Symptoms:  Burning sensation, painful when putting on shoes    Previous history of similar problem:   Yes- other areas of the body  Therapies Tried and outcome: None      Problem list and histories reviewed & adjusted, as indicated.  Additional history: as documented        Patient Active Problem List   Diagnosis     Menopausal syndrome (hot flashes)     Eczema     Bladder prolapse, female, acquired     Seasonal allergic rhinitis     Lipoma of skin and subcutaneous tissue     Primary osteoarthritis of left knee     Acute pain of left knee     Ganglion cyst     Past Surgical History:   Procedure Laterality Date     CYSTOSCOPY, SLING TRANSVAGINAL N/A 5/4/2017    Procedure: CYSTOSCOPY, SLING TRANSVAGINAL;  cystoscopy with pubovaginal sling, cystocele repair with mesh; sacralspinous fixation;  Surgeon: Yayo Tirado MD;  Location: [facility] OR     SURGICAL HISTORY OF -   1984    L wrist cyst removed     TONSILLECTOMY & ADENOIDECTOMY  1980     TUBAL LIGATION         Social History   Substance Use Topics     Smoking status: Former Smoker     Smokeless tobacco: Never Used     Alcohol use No     Family History   Problem Relation Age of Onset     Allergies Mother      Arthritis Mother      Cancer Mother      HEART DISEASE Maternal Grandfather      Asthma Sister      Allergies Sister      Arthritis Sister      Allergies Son      Allergies Daughter          Current Outpatient Prescriptions   Medication Sig Dispense Refill     fluticasone (FLONASE) 50 MCG/ACT spray Spray 2 sprays into both nostrils daily 1 Bottle 6     ibuprofen (ADVIL/MOTRIN) 800 MG tablet Take 1 tablet (800 mg) by mouth every 8  "hours as needed for moderate pain 60 tablet 1     Allergies   Allergen Reactions     Percocet [Oxycodone-Acetaminophen] Nausea     Tylenol W/Codeine [Acetaminophen-Codeine] Nausea     Vicodin [Hydrocodone-Acetaminophen] Other (See Comments)     Hot flashes     Recent Labs   Lab Test  04/26/17   0819  06/09/16   0942   LDL   --   139*   HDL   --   60   TRIG   --   74   CR  0.70  0.81   GFRESTIMATED  86  73   GFRESTBLACK  >90   GFR Calc    88   POTASSIUM  4.1  4.2      BP Readings from Last 3 Encounters:   09/04/18 112/60   05/11/18 110/60   05/08/18 90/72    Wt Readings from Last 3 Encounters:   09/04/18 142 lb (64.4 kg)   05/11/18 152 lb (68.9 kg)   05/08/18 151 lb (68.5 kg)                  Labs reviewed in EPIC    ROS:  Constitutional, HEENT, cardiovascular, pulmonary, gi and gu systems are negative, except as otherwise noted.    OBJECTIVE:     /60 (BP Location: Right arm, Patient Position: Chair, Cuff Size: Adult Regular)  Pulse 80  Temp 98  F (36.7  C) (Temporal)  Resp 16  Ht 5' 4\" (1.626 m)  Wt 142 lb (64.4 kg)  LMP 05/12/2011  SpO2 98%  BMI 24.37 kg/m2  Body mass index is 24.37 kg/(m^2).   Physical Exam   Constitutional: She is oriented to person, place, and time. She appears well-developed and well-nourished.   HENT:   Head: Normocephalic and atraumatic.   Cardiovascular: Normal rate, regular rhythm and normal heart sounds.    Pulmonary/Chest: Effort normal and breath sounds normal.   Neurological: She is alert and oriented to person, place, and time.   Psychiatric: She has a normal mood and affect.         Diagnostic Test Results:  none     ASSESSMENT/PLAN:     Problem List Items Addressed This Visit     Ganglion cyst - Primary     Painful lump along the 4th metatarsal on the right foot with tenderness along 4th and 5th metatarsal. Likely ganglion cyst.Will get x-ray to r/o any stress fractures  Refer to podiatry for excision             Other Visit Diagnoses     Visit for " screening mammogram        Relevant Orders    MA SCREENING DIGITAL BILAT - Future  (s+30)    Screening for HIV (human immunodeficiency virus)        Right foot pain        Relevant Orders    XR Foot Right G/E 3 Views    PODIATRY/FOOT & ANKLE SURGERY REFERRAL           Anaya Hernandez MD  [facility]

## 2018-09-04 ENCOUNTER — OFFICE VISIT (OUTPATIENT)
Dept: FAMILY MEDICINE | Facility: OTHER | Age: 58
End: 2018-09-04
Payer: COMMERCIAL

## 2018-09-04 ENCOUNTER — RADIANT APPOINTMENT (OUTPATIENT)
Dept: GENERAL RADIOLOGY | Facility: OTHER | Age: 58
End: 2018-09-04
Attending: FAMILY MEDICINE
Payer: COMMERCIAL

## 2018-09-04 VITALS
DIASTOLIC BLOOD PRESSURE: 60 MMHG | HEART RATE: 80 BPM | HEIGHT: 64 IN | BODY MASS INDEX: 24.24 KG/M2 | WEIGHT: 142 LBS | TEMPERATURE: 98 F | SYSTOLIC BLOOD PRESSURE: 112 MMHG | OXYGEN SATURATION: 98 % | RESPIRATION RATE: 16 BRPM

## 2018-09-04 DIAGNOSIS — M67.40 GANGLION CYST: Primary | ICD-10-CM

## 2018-09-04 DIAGNOSIS — N60.02 BREAST CYST, LEFT: ICD-10-CM

## 2018-09-04 DIAGNOSIS — M79.671 RIGHT FOOT PAIN: ICD-10-CM

## 2018-09-04 DIAGNOSIS — Z12.31 VISIT FOR SCREENING MAMMOGRAM: ICD-10-CM

## 2018-09-04 DIAGNOSIS — Z11.4 SCREENING FOR HIV (HUMAN IMMUNODEFICIENCY VIRUS): ICD-10-CM

## 2018-09-04 PROCEDURE — 73630 X-RAY EXAM OF FOOT: CPT | Mod: RT

## 2018-09-04 PROCEDURE — 99213 OFFICE O/P EST LOW 20 MIN: CPT | Performed by: FAMILY MEDICINE

## 2018-09-04 NOTE — MR AVS SNAPSHOT
After Visit Summary   9/4/2018    July Green    MRN: [id number]           Patient Information     Date Of Birth          1960        Visit Information        Provider Department      9/4/2018 9:40 AM Anaya Hernandez MD [facility]        Today's Diagnoses     Right foot pain    -  1    Visit for screening mammogram        Screening for HIV (human immunodeficiency virus)           Follow-ups after your visit        Additional Services     PODIATRY/FOOT & ANKLE SURGERY REFERRAL       Your provider has referred you to: [facility]: [facility] [phone]   [email or web address]/    Please be aware that coverage of these services is subject to the terms and limitations of your health insurance plan.  Call member services at your health plan with any benefit or coverage questions.      Please bring the following to your appointment:  >>   Any x-rays, CTs or MRIs which have been performed.  Contact the facility where they were done to arrange for  prior to your scheduled appointment.  Any new CT, MRI or other procedures ordered by your specialist must be performed at a [facility] facility or coordinated by your clinic's referral office.    >>   List of current medications   >>   This referral request   >>   Any documents/labs given to you for this referral                  Follow-up notes from your care team     Return in about 3 months (around 12/4/2018).      Your next 10 appointments already scheduled     Sep 11, 2018  9:30 AM CDT   New Visit with Alexys Dickson DPM   [facility] ([facility]    [address]  [place] [facility] [address]   [phone]            Dec 04, 2018  9:20 AM CST   Office Visit with Anaya Hernandez MD   [facility])    [address]  [facility] [address]   [phone]           Bring a current list of meds and any  "records pertaining to this visit. For Physicals, please bring immunization records and any forms needing to be filled out. Please arrive 10 minutes early to complete paperwork.              Future tests that were ordered for you today     Open Future Orders        Priority Expected Expires Ordered    XR Foot Right G/E 3 Views Routine 9/4/2018 9/4/2019 9/4/2018    MA SCREENING DIGITAL BILAT - Future  (s+30) Routine  8/31/2019 9/4/2018            Who to contact     If you have questions or need follow up information about today's clinic visit or your schedule please contact [facility] directly at [phone].  Normal or non-critical lab and imaging results will be communicated to you by MyChart, letter or phone within 4 business days after the clinic has received the results. If you do not hear from us within 7 days, please contact the clinic through MyChart or phone. If you have a critical or abnormal lab result, we will notify you by phone as soon as possible.  Submit refill requests through ToVieFor or call your pharmacy and they will forward the refill request to us. Please allow 3 business days for your refill to be completed.          Additional Information About Your Visit        Care EveryWhere ID     This is your Care EveryWhere ID. This could be used by other organizations to access your [facility] medical records  [id number]        Your Vitals Were     Pulse Temperature Respirations Height Last Period Pulse Oximetry    80 98  F (36.7  C) (Temporal) 16 5' 4\" (1.626 m) 05/12/2011 98%    BMI (Body Mass Index)                   24.37 kg/m2            Blood Pressure from Last 3 Encounters:   09/04/18 112/60   05/11/18 110/60   05/08/18 90/72    Weight from Last 3 Encounters:   09/04/18 142 lb (64.4 kg)   05/11/18 152 lb (68.9 kg)   05/08/18 151 lb (68.5 kg)              We Performed the Following     PODIATRY/FOOT & ANKLE SURGERY REFERRAL        Primary Care Provider Office Phone # Fax #    " Allan Peña PA-C [phone]       [address] [place] [address] [address]  [facility] [address]        Equal Access to Services     CITLALY LAWSON : Hadii aad ku haddionjewel Ponce, walindsayda edmondmarkoha, qacarinata kaalmada kelly, liane abin hayaan maikolstiven méndez jenny jimenez. So [facility] [phone].    ATENCIÓN: Si habla español, tiene a marley disposición servicios gratuitos de asistencia lingüística. Llame al [phone].    We comply with applicable federal civil rights laws and [place] laws. We do not discriminate on the basis of race, color, national origin, age, disability, sex, sexual orientation, or gender identity.            Thank you!     Thank you for choosing [facility]  for your care. Our goal is always to provide you with excellent care. Hearing back from our patients is one way we can continue to improve our services. Please take a few minutes to complete the written survey that you may receive in the mail after your visit with us. Thank you!             Your Updated Medication List - Protect others around you: Learn how to safely use, store and throw away your medicines at www.disposemymeds.org.          This list is accurate as of 9/4/18 10:09 AM.  Always use your most recent med list.                   Brand Name Dispense Instructions for use Diagnosis    fluticasone 50 MCG/ACT spray    FLONASE    1 Bottle    Spray 2 sprays into both nostrils daily    Acute URI       ibuprofen 800 MG tablet    ADVIL/MOTRIN    60 tablet    Take 1 tablet (800 mg) by mouth every 8 hours as needed for moderate pain    Primary osteoarthritis of left knee, Acute pain of left knee

## 2018-09-04 NOTE — ASSESSMENT & PLAN NOTE
Painful lump along the 4th metatarsal on the right foot with tenderness along 4th and 5th metatarsal. Likely ganglion cyst.Will get x-ray to r/o any stress fractures  Refer to podiatry for excision

## 2018-09-11 ENCOUNTER — OFFICE VISIT (OUTPATIENT)
Dept: PODIATRY | Facility: OTHER | Age: 58
End: 2018-09-11
Payer: COMMERCIAL

## 2018-09-11 VITALS
TEMPERATURE: 97.8 F | BODY MASS INDEX: 24.24 KG/M2 | HEIGHT: 64 IN | SYSTOLIC BLOOD PRESSURE: 110 MMHG | WEIGHT: 142 LBS | DIASTOLIC BLOOD PRESSURE: 64 MMHG

## 2018-09-11 DIAGNOSIS — M67.40 GANGLION CYST: Primary | ICD-10-CM

## 2018-09-11 PROCEDURE — 99243 OFF/OP CNSLTJ NEW/EST LOW 30: CPT | Performed by: PODIATRIST

## 2018-09-11 ASSESSMENT — PAIN SCALES - GENERAL: PAINLEVEL: NO PAIN (1)
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
## 2018-09-11 NOTE — MR AVS SNAPSHOT
After Visit Summary   9/11/2018    July Green    MRN: [id number]           Patient Information     Date Of Birth          1960        Visit Information        Provider Department      9/11/2018 9:30 AM Alexys Dickson DPM [facility] Instructions    Reliable shoe stores: To maximize your experience and provide the best possible fit.  Be sure to show them your foot concerns and tell them Dr. Dickson sent you.      Stores listed in bold have only athletic shoes, and stores that are not bold are mostly casual or variety of shoes    [employer]  [address] [place] [address]  [phone]    [employer] [place]  [address] [place] [address]  [phone]    [employer]  [address] [place] [address]  [phone]    [employer]  [address] [facility]  [place] [place] [address]  [phone]    [employer]  [address] [place] [address]  [phone]    [employer]  [address] [place] [address]  [phone]                         [employer] Locations:     [address] [place] [address]   [phone]     [address] [place] [address] [address] [place] [address]   [phone]     [address] [place] [address]   [phone]     [address] [address]   [phone]     [address] [place] [address] [place] [address]   [phone]     [address] [facility] [place] [address]   [phone]     [address] [facility] [address]   [place] [address]   [phone]     [address]   [place], [place] [id number] [phone]             How to find reasonable shoes          The correct width    Correct Fitting    Correct Length      Foot Distortion    Posture Distortion                          Torsional Rigidity      Grasp behind the heel and underneath the foot and twist      Bad    Excessive torsion/twist in midfoot     Less  "torsion/twist in midfoot is better                   Heel Counter Rigidity      Grasp just above   midsole and squeeze      Bad    Soft heel counter      Good    Rigid Heel Counter      Flexion Rigidity      Grasp shoe and bend from forefoot to rearfoot                        Follow-ups after your visit        Your next 10 appointments already scheduled     Dec 04, 2018  9:20 AM CST   Office Visit [facility] MD   [facility]    [address] [place] [id number] [phone]           Bring a current list of meds and any records pertaining to this visit. For Physicals, please bring immunization records and any forms needing to be filled out. Please arrive 10 minutes early to complete paperwork.              Who to contact     If you have questions or need follow up information about today's clinic visit or your schedule please contact [facility] directly at [phone].  Normal or non-critical lab and imaging results will be communicated to you by MyChart, letter or phone within 4 business days after the clinic has received the results. If you do not hear from us within 7 days, please contact the clinic through MyChart or phone. If you have a critical or abnormal lab result, we will notify you by phone as soon as possible.  Submit refill requests through [employer] or call your pharmacy and they will forward the refill request to us. Please allow 3 business days for your refill to be completed.          Additional Information About Your Visit        Care EveryWhere ID     This is your Care EveryWhere ID. This could be used by other organizations to access your [facility] medical records  [id number]        Your Vitals Were     Temperature Height Last Period BMI (Body Mass Index)          97.8  F (36.6  C) (Temporal) 5' 4\" (1.626 m) 05/12/2011 24.37 kg/m2         Blood Pressure from Last 3 Encounters:   09/11/18 110/64   09/04/18 " 112/60   05/11/18 110/60    Weight from Last 3 Encounters:   09/11/18 142 lb (64.4 kg)   09/04/18 142 lb (64.4 kg)   05/11/18 152 lb (68.9 kg)              Today, you had the following     No orders found for display       Primary Care Provider Office Phone # Fax #    Allan Peña PA-C [phone]       [address]  [facility] [address]        Equal Access to Services     CITLALY LAWSON : Hadii aad ku hadasho Soomaali, waaxda luqadaha, qaybta kaalmada adeegyada, waxay idiin hayaan adestiven alvarado . So [facility] [phone].    ATENCIÓN: Si habla español, tiene a marley disposición servicios gratuitos de asistencia lingüística. [facility] [phone].    We comply with applicable federal civil rights laws and [place] laws. We do not discriminate on the basis of race, color, national origin, age, disability, sex, sexual orientation, or gender identity.            Thank you!     Thank you for choosing [facility]  for your care. Our goal is always to provide you with excellent care. Hearing back from our patients is one way we can continue to improve our services. Please take a few minutes to complete the written survey that you may receive in the mail after your visit with us. Thank you!             Your Updated Medication List - Protect others around you: Learn how to safely use, store and throw away your medicines at www.disposemymeds.org.          This list is accurate as of 9/11/18 10:01 AM.  Always use your most recent med list.                   Brand Name Dispense Instructions for use Diagnosis    fluticasone 50 MCG/ACT spray    FLONASE    1 Bottle    Spray 2 sprays into both nostrils daily    Acute URI       ibuprofen 800 MG tablet    ADVIL/MOTRIN    60 tablet    Take 1 tablet (800 mg) by mouth every 8 hours as needed for moderate pain    Primary osteoarthritis of left knee, Acute pain of left knee
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
## 2018-09-11 NOTE — PATIENT INSTRUCTIONS
Reliable shoe stores: To maximize your experience and provide the best possible fit.  Be sure to show them your foot concerns and tell them Dr. Dickson sent you.      Stores listed in bold have only athletic shoes, and stores that are not bold are mostly casual or variety of shoes    [employer]  [address] [place] [address]  [phone]    [facility] [employer] [facility] [place]  [address] [place] [address]  [phone]     [employer] [facility]  [address] [place] [address]  [phone]    [employer]  [address] [facility]  [facility] [address]  [phone]    [employer]  [address] [place], [place] [address] [phone]    [employer]  [address] [place] [address]  [phone]                         [employer] Locations:     [address] [place] [address]   [phone]     [address] [place] [address] [address] [place] [address]   [phone]     [address] [place] [address]   [phone]     [address] [facility] [place] [address]   [phone]     [address] [place] [address]   [phone]     [address] [place] [address]   [phone]     [address]   [place] [address]   [phone]     [address] [facility]   [place] [address]   [phone]             How to find reasonable shoes          The correct width    Correct Fitting    Correct Length      Foot Distortion    Posture Distortion                          Torsional Rigidity      Grasp behind the heel and underneath the foot and twist      Bad    Excessive torsion/twist in midfoot     Less torsion/twist in midfoot is better                   Heel Counter Rigidity      Grasp just above   midsole and squeeze      Bad    Soft heel counter      Good    Rigid Heel Counter      Flexion Rigidity      Grasp shoe and bend from forefoot to rearfoot

## 2018-09-17 PROBLEM — M25.562 ACUTE PAIN OF LEFT KNEE: Status: RESOLVED | Noted: 2018-05-21 | Resolved: 2018-09-17

## 2018-09-17 NOTE — PROGRESS NOTES
Discharge Note    Progress reporting period is from initial eval to Jun 8, 2018.     July failed to return for next follow up visit and current status is unknown.  Please see information below for last relevant information on current status.  Patient seen for 2 visits.  SUBJECTIVE  Subjective changes noted by patient:  walking ~ 1 mile with minimal pain, stairs improving, pain on stairs down>up, still avoiding squatting  .  Current pain level is 0/10 (worst 3/10).     Previous pain level was  9/10.   Changes in function:  Yes (See Goal flowsheet attached for changes in current functional level)  Adverse reaction to treatment or activity: None    OBJECTIVE  Changes noted in objective findings: - bounce home, + knee hyperflexion L, + sciatic n tension     ASSESSMENT/PLAN  Diagnosis: L knee pain   DIAGP:  The encounter diagnosis was Acute pain of left knee.  Updated problem list and treatment plan:   Pain - HEP  Decreased function - HEP  STG/LTGs have been met or progress has been made towards goals:  Yes, please see goal flowsheet for most current information  Assessment of Progress: current status is unknown.    Last current status: Pt is progressing as expected   Self Management Plans:  HEP  I have re-evaluated this patient and find that the nature, scope, duration and intensity of the therapy is appropriate for the medical condition of the patient.  July continues to require the following intervention to meet STG and LTG's:  HEP.    Recommendations:  Discharge with current home program.  Patient to follow up with MD as needed.    Please refer to the daily flowsheet for treatment today, total treatment time and time spent performing 1:1 timed codes.    Jarad Hill,PT, DPT, OCS

## 2018-12-20 ENCOUNTER — TELEPHONE (OUTPATIENT)
Dept: FAMILY MEDICINE | Facility: OTHER | Age: 58
End: 2018-12-20

## 2018-12-20 NOTE — LETTER
[facility]  [address]   [facility] [address]  Phone: [phone]  January 3, 2019      July Green  [address]   [facility] [address]      Dear July,    We care about your health and have reviewed your health plan including your medical conditions, medications, and lab results.  Based on this review, it is recommended that you follow up regarding the following health topic(s):  -Breast Cancer Screening    We recommend you take the following action(s):  -schedule a MAMMOGRAM which is due. Please disregard this reminder if you have had this exam elsewhere within the last 1-2 years please let us know so we can update your records.     Please call us at the [facility] - [phone] (or use [facility]) to address the above recommendations.     Thank you for trusting [facility] and we appreciate the opportunity to serve you.  We look forward to supporting your healthcare needs in the future.    Healthy Regards,    Your Health Care Team  [facility]

## 2018-12-20 NOTE — TELEPHONE ENCOUNTER
Summary:    Patient is due/failing the following:   MAMMOGRAM    Action needed:   Schedule a mammogram     Type of outreach:    Phone, left message for patient to call back.     Questions for provider review:    None                                                                                                                                    Ambika Yen     Chart routed to Care Team .        Panel Management Review      Patient has the following on her problem list: None      Composite cancer screening  Chart review shows that this patient is due/due soon for the following Mammogram

## 2018-12-28 ENCOUNTER — OFFICE VISIT (OUTPATIENT)
Dept: FAMILY MEDICINE | Facility: OTHER | Age: 58
End: 2018-12-28
Payer: COMMERCIAL

## 2018-12-28 ENCOUNTER — TELEPHONE (OUTPATIENT)
Dept: FAMILY MEDICINE | Facility: OTHER | Age: 58
End: 2018-12-28

## 2018-12-28 VITALS
HEART RATE: 90 BPM | WEIGHT: 138 LBS | DIASTOLIC BLOOD PRESSURE: 60 MMHG | RESPIRATION RATE: 18 BRPM | SYSTOLIC BLOOD PRESSURE: 116 MMHG | OXYGEN SATURATION: 93 % | TEMPERATURE: 98.6 F | BODY MASS INDEX: 23.69 KG/M2

## 2018-12-28 DIAGNOSIS — J10.1 INFLUENZA A: Primary | ICD-10-CM

## 2018-12-28 LAB
FLUAV+FLUBV AG SPEC QL: NEGATIVE
FLUAV+FLUBV AG SPEC QL: POSITIVE
SPECIMEN SOURCE: ABNORMAL

## 2018-12-28 PROCEDURE — 87804 INFLUENZA ASSAY W/OPTIC: CPT | Performed by: PHYSICIAN ASSISTANT

## 2018-12-28 PROCEDURE — 99213 OFFICE O/P EST LOW 20 MIN: CPT | Performed by: PHYSICIAN ASSISTANT

## 2018-12-28 RX ORDER — OSELTAMIVIR PHOSPHATE 75 MG/1
75 CAPSULE ORAL 2 TIMES DAILY
Qty: 10 CAPSULE | Refills: 0 | Status: SHIPPED | OUTPATIENT
Start: 2018-12-28 | End: 2019-08-30

## 2018-12-28 NOTE — TELEPHONE ENCOUNTER
July Green is a 58 year old female who calls with cough and congestion x2 days    NURSING ASSESSMENT:  Description:  cough, light sensitivity, unable to check temperature but feels warm, chest congestion, wheezing, ribs and back hurt.  Onset/duration:  2 days  Precip. factors:  Several people ill at work, no chronic lung conditions  Associated symptoms:  Wheezing, fatigue, light sensitivity,  Improves/worsens symptoms:  Equate sinus and congestion   Pain scale (0-10)   0/10  LMP/preg/breast feeding:  NA  Last exam/Treatment:  9/4/18  Allergies:   Allergies   Allergen Reactions     Percocet [Oxycodone-Acetaminophen] Nausea     Tylenol W/Codeine [Acetaminophen-Codeine] Nausea     Vicodin [Hydrocodone-Acetaminophen] Other (See Comments)     Hot flashes       MEDICATIONS:   Taking medication(s) as prescribed? not asked  Taking over the counter medication(s?) Yes equate sinus and congestion  Any medication side effects? Not asked    Any barriers to taking medication(s) as prescribed?  No  Medication(s) improving/managing symptoms?  No  Medication reconciliation completed: No      NURSING PLAN: Huddle with provider, plan includes provider will see pt in clinic this afternoon     RECOMMENDED DISPOSITION:  See in 24 hours - provider will see in clinic this afternoon  Will comply with recommendation: Yes  If further questions/concerns or if symptoms do not improve, worsen or new symptoms develop, call your PCP or [facility] as soon as possible.      Guideline used:  Telephone Triage Protocols for Nurses, Fifth Edition, Valery Salazar RN

## 2018-12-28 NOTE — PROGRESS NOTES
"  SUBJECTIVE:   July Green is a 58 year old female who presents to clinic today for the following health issues:      HPI     Acute Illness   Acute illness concerns: cough, congestion  Onset: 3 days (since Wednesday)    Fever: no    Chills/Sweats: YES- \"was so warm yesterday and couldn't cool off\"    Headache (location?): YES    Sinus Pressure: YES- post-nasal drainage and facial pain    Conjunctivitis:  no    Ear Pain: no    Rhinorrhea: YES    Congestion: YES    Sore Throat: no     Cough: YES-productive of yellow sputum    Wheeze: YES    Decreased Appetite: YES    Nausea: no    Vomiting: no    Diarrhea:  no    Dysuria/Freq.: no    Fatigue/Achiness: YES- fatigue    Sick/Strep Exposure: YES- daughter had strep a month ago and was sick for three weeks     Therapies Tried and outcome: equate/mucinex sinus congestion and cough which helped the \"phlegmy feeling in chest\"    Symptoms persistent for the past few days with constant nasal/sinus congestion and pressure, headache, light sensitivity, productive cough, fatigue/lethargy.     Problem list and histories reviewed & adjusted, as indicated.  Additional history: none    ROS:  GENERAL: +Fatigue, chills and sweats. Denies fever, weakness, weight gain, or weight loss.  HEENT: Eyes-Denies pain, redness, loss of vision, double or blurred vision.     Ears/Nose- +Nasal congestion. Denies tinnitus, loss of hearing, epistaxis, decreased sense of smell. Denies loss of sense of taste, dry mouth, or sore throat.   CARDIOVASCULAR: Denies chest pain, shortness of breath, irregular heartbeats,  palpitations, or edema.  RESPIRATORY: +Productive cough, wheezing. Denies hemoptysis or shortness of breath.   NEUROLOGIC: +Frontal headache with light sensitivity. Denies fainting, dizziness, memory loss, numbness, tingling, or seizures.    OBJECTIVE:     /60   Pulse 90   Temp 98.6  F (37  C) (Temporal)   Resp 18   Wt 62.6 kg (138 lb)   LMP 05/12/2011   SpO2 93%   BMI " 23.69 kg/m    Body mass index is 23.69 kg/m .  GENERAL: appears ill and fatigued.   EYES: Eyes grossly normal to inspection, PERRL and conjunctivae and sclerae normal  HENT: ear canals and TM's normal. Nasal mucosa is erythematous.   NECK: Bilateral anterior cervical chain adenopathy.   RESP: lungs clear to auscultation - no rales, rhonchi or wheezes  CV: regular rate and rhythm, normal S1 S2, no S3 or S4, no murmur, click or rub    Results for orders placed or performed in visit on 12/28/18   Influenza A/B antigen   Result Value Ref Range    Influenza A/B Agn Specimen Nasopharyngeal     Influenza A Positive (A) NEG^Negative    Influenza B Negative NEG^Negative       ASSESSMENT/PLAN:       ICD-10-CM    1. Influenza A J10.1 Influenza A/B antigen     oseltamivir (TAMIFLU) 75 MG capsule       Positive influenza A.  Symptoms began less than 72 hours ago so will prescribe Tamiflu to take twice daily for 5 days to speed up her recovery.  Encouraged to get plenty of rest and fluids.  Continue with ibuprofen and Tylenol.  Mucinex and Flonase can help with the congestion.  Follow up if not improving.    Allan Peña PA-C  [facility]

## 2018-12-28 NOTE — PATIENT INSTRUCTIONS
You have influenza A.  Will prescribe Tamiflu to take twice daily for 5 days to speed up your recovery.  Get plenty of rest and fluids.  Continue with ibuprofen and Tylenol.  Mucinex and Flonase can help with the congestion.  Follow up if not improving.

## 2018-12-28 NOTE — TELEPHONE ENCOUNTER
Reason for Call:  Same Day Appointment, Requested Provider:  NEWTON Montilla     PCP: Allan Peña    Reason for visit: fever, cough, headache, chills    Duration of symptoms: 3 days    Have you been treated for this in the past? No    Additional comments: pt would like to be seen in [place] today    Can we leave a detailed message on this number? YES    Phone number patient can be reached at: Cell number on file:    Telephone Information:   Mobile [phone]       Best Time: any    Call taken on 12/28/2018 at 12:24 PM by Yara Corral

## 2018-12-31 ENCOUNTER — TELEPHONE (OUTPATIENT)
Dept: FAMILY MEDICINE | Facility: OTHER | Age: 58
End: 2018-12-31

## 2018-12-31 NOTE — TELEPHONE ENCOUNTER
Reason for call:  Patient reporting a symptom    Symptom or request: follow-up cough    Duration (how long have symptoms been present): seen friday    Have you been treated for this before? no    Additional comments: was told to call if not better, or if she got worse. She declined appt wanted to see if she could speak with triage first.    Phone Number patient can be reached at:  Home number on file [phone] (home)    Best Time:  any    Can we leave a detailed message on this number:  YES    Call taken on 12/31/2018 at 9:10 AM by July Joe

## 2018-12-31 NOTE — TELEPHONE ENCOUNTER
"July Green is a 58 year old female who calls with influenza A.    NURSING ASSESSMENT:  Description:  I spoke with the pt who states she is having \"green sputum coming out of lung area\", coughing and wheezing. No fever. Fatigued. The influenza symptoms are better. Cough sounds very deep and wet on the phone.  Onset/duration: ongoing  Precip. factors:  Influenza A    Allergies:   Allergies   Allergen Reactions     Percocet [Oxycodone-Acetaminophen] Nausea     Tylenol W/Codeine [Acetaminophen-Codeine] Nausea     Vicodin [Hydrocodone-Acetaminophen] Other (See Comments)     Hot flashes     RECOMMENDED DISPOSITION:  See in 2-4 hours  Will comply with recommendation: Yes, pt will go to local UC  If further questions/concerns or if symptoms do not improve, worsen or new symptoms develop, call your PCP or [facility] as soon as possible.      Guideline used: Cough  Telephone Triage Protocols for Nurses, Fifth Edition, Valery Anthony RN    "

## 2019-04-04 ENCOUNTER — TELEPHONE (OUTPATIENT)
Dept: FAMILY MEDICINE | Facility: OTHER | Age: 59
End: 2019-04-04

## 2019-04-04 NOTE — LETTER
[facility]  [address]   [place] [address]  Phone: [phone]  April 29, 2019      July Green  [address]   [place] [address]      Dear July,    We care about your health and have reviewed your health plan including your medical conditions, medications, and lab results.  Based on this review, it is recommended that you follow up regarding the following health topic(s):  -Breast Cancer Screening  -Colon Cancer Screening  -Wellness (Physical) Visit     We recommend you take the following action(s):  -schedule a MAMMOGRAM which is due. Please disregard this reminder if you have had this exam elsewhere within the last 1-2 years please let us know so we can update your records.  -schedule a COLONOSCOPY to look for colon cancer (due every 10 years or 5 years in higher risk situations.)  Colonoscopies can prevent 90-95% of colon cancer deaths.  Problem lesions can be removed before they ever become cancer.  If you do not wish to do a colonoscopy or cannot afford to do one at this time, there is another option called a Fecal Immunochemical Occult Blood Test (FIT) a take home stool sample kit.  It does not replace the colonoscopy for colorectal cancer screening, but it can detect hidden bleeding in the lower colon.  It does need to be repeated every year and if a positive result is obtained, you would be referred for a colonoscopy.  If you have completed either one of these tests at another facility, please have the records sent to our clinic for our records.     Please call us at the [facility] - [phone] (or use [employer]) to address the above recommendations.     Thank you for trusting [facility] and we appreciate the opportunity to serve you.  We look forward to supporting your healthcare needs in the future.    Healthy Regards,    Your Health Care Team  [facility]

## 2019-04-04 NOTE — TELEPHONE ENCOUNTER
Summary:    Patient is due/failing the following:   Physical, FIT test and MAMMOGRAM    Action needed:   Patient needs office visit for Physical. and schedule a mammogram     Type of outreach:    Phone, left message for patient to call back.     Questions for provider review:    None                                                                                                                                    Ambika Yen       Chart routed to Care Team .  Panel Management Review      Patient has the following on her problem list: None      Composite cancer screening  Chart review shows that this patient is due/due soon for the following Mammogram

## 2019-07-01 ENCOUNTER — TELEPHONE (OUTPATIENT)
Dept: FAMILY MEDICINE | Facility: OTHER | Age: 59
End: 2019-07-01

## 2019-07-01 DIAGNOSIS — Z12.11 SCREEN FOR COLON CANCER: Primary | ICD-10-CM

## 2019-07-01 NOTE — TELEPHONE ENCOUNTER
Please call and remind patient that she is due for FIT testing for colon cancer screening. Updated order placed if she does not have a kit.    Allan Peña PA-C

## 2019-07-01 NOTE — LETTER
[address]  [facility] [address]  [phone]        July 2, 2019    July Green  [address]   [place] [address]          Dear July,    You are due for your yearly FIT test. Please stop by the clinic to  the testing kit.    Sincerely,        NEWTON Montilla

## 2019-08-30 ENCOUNTER — OFFICE VISIT (OUTPATIENT)
Dept: FAMILY MEDICINE | Facility: CLINIC | Age: 59
End: 2019-08-30
Payer: COMMERCIAL

## 2019-08-30 VITALS
BODY MASS INDEX: 24.72 KG/M2 | DIASTOLIC BLOOD PRESSURE: 62 MMHG | WEIGHT: 144 LBS | RESPIRATION RATE: 16 BRPM | TEMPERATURE: 98.8 F | HEART RATE: 78 BPM | SYSTOLIC BLOOD PRESSURE: 104 MMHG

## 2019-08-30 DIAGNOSIS — H93.11 TINNITUS, RIGHT: Primary | ICD-10-CM

## 2019-08-30 PROCEDURE — 99213 OFFICE O/P EST LOW 20 MIN: CPT | Performed by: FAMILY MEDICINE

## 2019-08-30 ASSESSMENT — ENCOUNTER SYMPTOMS
RESPIRATORY NEGATIVE: 1
GASTROINTESTINAL NEGATIVE: 1
CARDIOVASCULAR NEGATIVE: 1
CONSTITUTIONAL NEGATIVE: 1
EYES NEGATIVE: 1

## 2019-08-30 ASSESSMENT — PATIENT HEALTH QUESTIONNAIRE - PHQ9
10. IF YOU CHECKED OFF ANY PROBLEMS, HOW DIFFICULT HAVE THESE PROBLEMS MADE IT FOR YOU TO DO YOUR WORK, TAKE CARE OF THINGS AT HOME, OR GET ALONG WITH OTHER PEOPLE: NOT DIFFICULT AT ALL
SUM OF ALL RESPONSES TO PHQ QUESTIONS 1-9: 3
SUM OF ALL RESPONSES TO PHQ QUESTIONS 1-9: 3

## 2019-08-30 ASSESSMENT — PAIN SCALES - GENERAL: PAINLEVEL: NO PAIN (0)

## 2019-08-30 NOTE — PATIENT INSTRUCTIONS
July,    It was great seeing you in clinic today.  I summarized our discussion and your plan below.  Please let me know if you have any questions or concerns.  Please follow up with me as discussed in clinic or sooner if any worsening or additional concerns.     1. Tinnitus, right  We are referring you to audiology for an evaluation for your ringing in the ears.  I also recommend a sinus rinse, this can be purchased at most pharmacies over-the-counter.  Please follow-up with primary care provider after audiology visit.  You are also due for your annual well woman exam.  - AUDIOLOGY ADULT REFERRAL       Sincerely,  Dr. GEOFFREY Verma MD, FAAFP  Family Medicine Physician  [facility] [place]  [address] [place] [address]    Patient Education

## 2019-08-30 NOTE — PROGRESS NOTES
Subjective     July Green is a 58 year old female who presents to clinic today for the following health issues:    58-year-old female with 1 year history of tinnitus, seems to be worsening, worse at night.  Sure if she has had hearing loss.  No exposure to loud noises for prolonged period of time.  Not currently taking aspirin.  She is tried Sudafed.  Other than that feeling well, no nausea vomiting diarrhea.  No chest pain or shortness of breath.  No fever, fatigue, chills.    History of Present Illness        She eats 0-1 servings of fruits and vegetables daily.She consumes 3 sweetened beverage(s) daily.  She is taking medications regularly.     Concern - ringing in ears   Onset: ongoing for a year or more     Description:   Right ear only    Intensity: mild    Progression of Symptoms:  worsening and constant    Accompanying Signs & Symptoms:  Feels like she is under water and ringing is getting louder, no nasal pressure, no ear pain, no drainage, both ears feel full    Previous history of similar problem:   None     Precipitating factors:   Worsened by: when it is quiet she notices this more, lying down    Alleviating factors:  Improved by: none     Therapies Tried and outcome: OTC drops for ear ringing, sudafed, zyrtec, aleve, cotton swab with peroxide     Patient Active Problem List   Diagnosis     Menopausal syndrome (hot flashes)     Eczema     Bladder prolapse, female, acquired     Seasonal allergic rhinitis     Lipoma of skin and subcutaneous tissue     Primary osteoarthritis of left knee     Ganglion cyst     Past Surgical History:   Procedure Laterality Date     CYSTOSCOPY, SLING TRANSVAGINAL N/A 5/4/2017    Procedure: CYSTOSCOPY, SLING TRANSVAGINAL;  cystoscopy with pubovaginal sling, cystocele repair with mesh; sacralspinous fixation;  Surgeon: Yayo Tirado MD;  Location:  OR     SURGICAL HISTORY OF -   1984    L wrist cyst removed     TONSILLECTOMY & ADENOIDECTOMY  1980     TUBAL  LIGATION         Social History     Tobacco Use     Smoking status: Former Smoker     Smokeless tobacco: Never Used   Substance Use Topics     Alcohol use: No     Family History   Problem Relation Age of Onset     Allergies Mother      Arthritis Mother      Cancer Mother      Heart Disease Maternal Grandfather      Asthma Sister      Allergies Sister      Arthritis Sister      Allergies Son      Allergies Daughter            Reviewed and updated as needed this visit by Provider         Review of Systems   Constitutional: Negative.    Eyes: Negative.    Respiratory: Negative.    Cardiovascular: Negative.    Gastrointestinal: Negative.    All other systems reviewed and are negative.         Objective    LMP 05/12/2011   There is no height or weight on file to calculate BMI.  Physical Exam   Constitutional: She appears well-developed and well-nourished. She appears distressed.   HENT:   Head: Normocephalic and atraumatic.   Right Ear: External ear normal.   Left Ear: External ear normal.   Nose: Nose normal.   Mouth/Throat: Oropharynx is clear and moist. No oropharyngeal exudate.   Eyes: Pupils are equal, round, and reactive to light. Conjunctivae and EOM are normal. No scleral icterus.   Neck: Normal range of motion. Neck supple. No thyromegaly present.   Minimal clear fluid behind right TM, otherwise both TMs normal.   Lymphadenopathy:     She has no cervical adenopathy.   Skin: She is not diaphoretic.        none         Assessment & Plan     1. Tinnitus, right  1 year history of tinnitus, right ear only, sending for audiology evaluation.  She has never had a hearing test before.  Also recommended trying a sinus rinse or Fariba pot for her minimally fluid behind her right TM.  - AUDIOLOGY ADULT REFERRAL       See Patient Instructions    Return in about 4 weeks (around 9/27/2019) for Annual Well Check.    Frankie Verma MD  [facility]    Answers for HPI/ROS submitted by the patient on 8/30/2019    Chronic problems general questions HPI Form  If you checked off any problems, how difficult have these problems made it for you to do your work, take care of things at home, or get along with other people?: Not difficult at all  PHQ9 TOTAL SCORE: 3

## 2019-08-31 ASSESSMENT — PATIENT HEALTH QUESTIONNAIRE - PHQ9: SUM OF ALL RESPONSES TO PHQ QUESTIONS 1-9: 3

## 2019-09-13 ENCOUNTER — TELEPHONE (OUTPATIENT)
Dept: FAMILY MEDICINE | Facility: CLINIC | Age: 59
End: 2019-09-13

## 2019-09-13 NOTE — TELEPHONE ENCOUNTER
Please schedule with Audiology in [place] for eval of increasing tinnitus.  Dr. GEOFFREY Verma MD, FAAFP  Family Medicine Physician  [facility] [place]  [address] [place] [address]

## 2019-09-13 NOTE — TELEPHONE ENCOUNTER
You placed a referral for patient to audiology on 8/30/19.  Patient has not scheduled as of yet.      Please review and forward to team if follow up with the patient is needed.     Thank you!  Kathy/Clinic Referrals Dyad II

## 2019-09-13 NOTE — LETTER
[facility]  [address]  [place] [address]  [phone]      September 13, 2019    July Green                                                                                                                     [address]   [place] [address]      Dear July,    We received a notice that you are to be scheduled with a specialty clinic. The referral has been placed by your provider and you can call to schedule an appointment directly.     Enclosed, you will find the referral with the phone number to call to schedule an appointment.  If you have already scheduled this, you may disregard this letter.    Please call us if you have any questions or concerns.      Sincerely,     [facility] Support Staff / Michelle

## 2020-01-21 ENCOUNTER — TELEPHONE (OUTPATIENT)
Dept: FAMILY MEDICINE | Facility: OTHER | Age: 60
End: 2020-01-21

## 2020-01-21 NOTE — TELEPHONE ENCOUNTER
Summary:    Patient is due/failing the following:   LDL, FIT, MAMMOGRAM and PHYSICAL    Action needed:   Patient needs office visit for Physical., Patient needs fasting lab only appointment and schedule a mammogram and complete a FIT test     Type of outreach:    Phone, left message for patient to call back.     Questions for provider review:    None                                                                                                                                    Ambika Colón CMA       Chart routed to Care Team .

## 2020-01-21 NOTE — LETTER
[facility]  [address]   [place] [address]  Phone: [phone]  January 31, 2020      July Green  [address]   [place] [address]      Dear July,    We care about your health and have reviewed your health plan including your medical conditions, medications, and lab results.  Based on this review, it is recommended that you follow up regarding the following health topic(s):  -Breast Cancer Screening  -Colon Cancer Screening  -Wellness (Physical) Visit     We recommend you take the following action(s):  -schedule a WELLNESS (Physical) APPOINTMENT.  We will perform the following labs: Lipids (fasting cholesterol - nothing to eat except water and/or meds for 8-10 hours).  -schedule a MAMMOGRAM which is due. Please disregard this reminder if you have had this exam elsewhere within the last 1-2 years please let us know so we can update your records.  -schedule a COLONOSCOPY to look for colon cancer (due every 10 years or 5 years in higher risk situations.)  Colonoscopies can prevent 90-95% of colon cancer deaths.  Problem lesions can be removed before they ever become cancer.  If you do not wish to do a colonoscopy or cannot afford to do one at this time, there is another option called a Fecal Immunochemical Occult Blood Test (FIT) a take home stool sample kit.  It does not replace the colonoscopy for colorectal cancer screening, but it can detect hidden bleeding in the lower colon.  It does need to be repeated every year and if a positive result is obtained, you would be referred for a colonoscopy.  If you have completed either one of these tests at another facility, please have the records sent to our clinic for our records.     Please call us at the [facility] - [phone] (or use [employer]) to address the above recommendations.     Thank you for trusting [facility] and we appreciate the opportunity to serve you.  We look forward to  supporting your healthcare needs in the future.    Healthy Regards,    Your Health Care Team  [facility]

## 2020-02-11 ENCOUNTER — TRANSFERRED RECORDS (OUTPATIENT)
Dept: HEALTH INFORMATION MANAGEMENT | Facility: CLINIC | Age: 60
End: 2020-02-11

## 2020-07-01 ENCOUNTER — TELEPHONE (OUTPATIENT)
Dept: FAMILY MEDICINE | Facility: OTHER | Age: 60
End: 2020-07-01

## 2020-07-01 NOTE — LETTER
[facility]  [address]  [place] [address]  Phone: [phone]  July 24, 2020      July Green  [address]   [place] [address]      Dear July,    We care about your health and have reviewed your health plan including your medical conditions, medications, and lab results.  Based on this review, it is recommended that you follow up regarding the following health topic(s):  -Breast Cancer Screening  -Colon Cancer Screening  -Wellness (Physical) Visit     We recommend you take the following action(s):  -schedule a WELLNESS (Physical) APPOINTMENT.  We will perform the following labs: Lipids (fasting cholesterol - nothing to eat except water and/or meds for 8-10 hours).  -schedule a MAMMOGRAM which is due. Please disregard this reminder if you have had this exam elsewhere within the last 1-2 years please let us know so we can update your records.  -schedule a COLONOSCOPY to look for colon cancer (due every 10 years or 5 years in higher risk situations.)  Colonoscopies can prevent 90-95% of colon cancer deaths.  Problem lesions can be removed before they ever become cancer.  If you do not wish to do a colonoscopy or cannot afford to do one at this time, there is another option called a Fecal Immunochemical Occult Blood Test (FIT) a take home stool sample kit.  It does not replace the colonoscopy for colorectal cancer screening, but it can detect hidden bleeding in the lower colon.  It does need to be repeated every year and if a positive result is obtained, you would be referred for a colonoscopy.  If you have completed either one of these tests at another facility, please have the records sent to our clinic for our records.     Please call us at the [facility] - [phone] (or use [employer]) to address the above recommendations.     Thank you for trusting [facility] and we appreciate the opportunity to serve you.  We look forward  to supporting your healthcare needs in the future.    Healthy Regards,    Your Health Care Team  [facility]

## 2020-07-01 NOTE — TELEPHONE ENCOUNTER
Summary:    Patient is due/failing the following:   COLONOSCOPY, MAMMOGRAM and PHYSICAL    Action needed:   Patient needs office visit for Physical . and schedule a mammogram and colonoscopy or complete a FIT test     Type of outreach:    Phone, left message for patient to call back.     Questions for provider review:    None                                                                                                                                    Ambika Colón CMA       Chart routed to Care Team .        Panel Management Review      Patient has the following on her problem list: None      Composite cancer screening  Chart review shows that this patient is due/due soon for the following Mammogram and Colonoscopy

## 2022-03-08 ENCOUNTER — TRANSFERRED RECORDS (OUTPATIENT)
Dept: HEALTH INFORMATION MANAGEMENT | Facility: CLINIC | Age: 62
End: 2022-03-08
Payer: COMMERCIAL

## 2022-08-16 ENCOUNTER — TRANSFERRED RECORDS (OUTPATIENT)
Dept: HEALTH INFORMATION MANAGEMENT | Facility: CLINIC | Age: 62
End: 2022-08-16

## 2023-03-02 ENCOUNTER — TRANSFERRED RECORDS (OUTPATIENT)
Dept: HEALTH INFORMATION MANAGEMENT | Facility: CLINIC | Age: 63
End: 2023-03-02

## 2023-03-20 ENCOUNTER — OFFICE VISIT (OUTPATIENT)
Dept: FAMILY MEDICINE | Facility: OTHER | Age: 63
End: 2023-03-20
Payer: COMMERCIAL

## 2023-03-20 VITALS
RESPIRATION RATE: 16 BRPM | OXYGEN SATURATION: 94 % | WEIGHT: 143 LBS | DIASTOLIC BLOOD PRESSURE: 64 MMHG | HEART RATE: 88 BPM | TEMPERATURE: 97.9 F | BODY MASS INDEX: 24.41 KG/M2 | HEIGHT: 64 IN | SYSTOLIC BLOOD PRESSURE: 108 MMHG

## 2023-03-20 DIAGNOSIS — M17.11 PRIMARY OSTEOARTHRITIS OF RIGHT KNEE: ICD-10-CM

## 2023-03-20 DIAGNOSIS — G89.29 OTHER CHRONIC PAIN: Primary | ICD-10-CM

## 2023-03-20 LAB
AMPHETAMINES UR QL: NOT DETECTED
BARBITURATES UR QL SCN: NOT DETECTED
BENZODIAZ UR QL SCN: NOT DETECTED
BUPRENORPHINE UR QL: NOT DETECTED
CANNABINOIDS UR QL: NOT DETECTED
COCAINE UR QL SCN: NOT DETECTED
D-METHAMPHET UR QL: NOT DETECTED
METHADONE UR QL SCN: NOT DETECTED
OPIATES UR QL SCN: NOT DETECTED
OXYCODONE UR QL SCN: NOT DETECTED
PCP UR QL SCN: NOT DETECTED
PROPOXYPH UR QL: NOT DETECTED
TRICYCLICS UR QL SCN: NOT DETECTED

## 2023-03-20 PROCEDURE — 99204 OFFICE O/P NEW MOD 45 MIN: CPT | Performed by: FAMILY MEDICINE

## 2023-03-20 PROCEDURE — 80306 DRUG TEST PRSMV INSTRMNT: CPT | Performed by: FAMILY MEDICINE

## 2023-03-20 RX ORDER — HYDROCODONE BITARTRATE AND ACETAMINOPHEN 5; 325 MG/1; MG/1
1 TABLET ORAL EVERY 6 HOURS PRN
Qty: 15 TABLET | Refills: 0 | Status: SHIPPED | OUTPATIENT
Start: 2023-03-20 | End: 2023-03-23

## 2023-03-20 ASSESSMENT — PATIENT HEALTH QUESTIONNAIRE - PHQ9: SUM OF ALL RESPONSES TO PHQ QUESTIONS 1-9: 4

## 2023-03-20 ASSESSMENT — ANXIETY QUESTIONNAIRES
6. BECOMING EASILY ANNOYED OR IRRITABLE: SEVERAL DAYS
3. WORRYING TOO MUCH ABOUT DIFFERENT THINGS: NOT AT ALL
5. BEING SO RESTLESS THAT IT IS HARD TO SIT STILL: NOT AT ALL
4. TROUBLE RELAXING: NOT AT ALL
1. FEELING NERVOUS, ANXIOUS, OR ON EDGE: NOT AT ALL
GAD7 TOTAL SCORE: 1
GAD7 TOTAL SCORE: 1
7. FEELING AFRAID AS IF SOMETHING AWFUL MIGHT HAPPEN: NOT AT ALL
2. NOT BEING ABLE TO STOP OR CONTROL WORRYING: NOT AT ALL

## 2023-03-20 ASSESSMENT — PAIN SCALES - GENERAL: PAINLEVEL: MODERATE PAIN (4)

## 2023-03-20 NOTE — LETTER
Opioid / Opioid Plus Controlled Substance Agreement    This is an agreement between you and your provider about the safe and appropriate use of controlled substance/opioids prescribed by your care team. Controlled substances are medicines that can cause physical and mental dependence (abuse).    There are strict laws about having and using these medicines. We here at [facility] are committing to working with you in your efforts to get better. To support you in this work, we ll help you schedule regular office appointments for medicine refills. If we must cancel or change your appointment for any reason, we ll make sure you have enough medicine to last until your next appointment.     As a Provider, I will:    Listen carefully to your concerns and treat you with respect.     Recommend a treatment plan that I believe is in your best interest. This plan may involve therapies other than opioid pain medication.     Talk with you often about the possible benefits, and the risk of harm of any medicine that we prescribe for you.     Provide a plan on how to taper (discontinue or go off) using this medicine if the decision is made to stop its use.    As a Patient, I understand that opioid(s):     Are a controlled substance prescribed by my care team to help me function or work and manage my condition(s).     Are strong medicines and can cause serious side effects such as:    Drowsiness, which can seriously affect my driving ability    A lower breathing rate, enough to cause death    Harm to my thinking ability     Depression     Abuse of and addiction to this medicine    Need to be taken exactly as prescribed. Combining opioids with certain medicines or chemicals (such as illegal drugs, sedatives, sleeping pills, and benzodiazepines) can be dangerous or even fatal. If I stop opioids suddenly, I may have severe withdrawal symptoms.    Do not work for all types of pain nor for all patients. If they re not helpful, I may  be asked to stop them.        The risks, benefits and side effects of these medicine(s) were explained to me. I agree that:  1. I will take part in other treatments as advised by my care team. This may be psychiatry or counseling, physical therapy, behavioral therapy, group treatment or a referral to a specialist.     2. I will keep all my appointments. I understand that this is part of the monitoring of opioids. My care team may require an office visit for EVERY opioid/controlled substance refill. If I miss appointments or don t follow instructions, my care team may stop my medicine.    3. I will take my medicines as prescribed. I will not change the dose or schedule unless my care team tells me to. There will be no refills if I run out early.     4. I may be asked to come to the clinic and complete a urine drug test or complete a pill count at any time. If I don t give a urine sample or participate in a pill count, the care team may stop my medicine.    5. I will only receive prescriptions from this clinic for chronic pain. If I am treated by another provider for acute pain issues, I will tell them that I am taking opioid pain medication for chronic pain and that I have a treatment agreement with this provider. I will inform my [facility] care team within one business day if I am given a prescription for any pain medication by another healthcare provider. My [facility] care team can contact other providers and pharmacists about my use of any medicines.    6. It is up to me to make sure that I don t run out of my medicines on weekends or holidays. If my care team is willing to refill my opioid prescription without a visit, I must request refills only during office hours. Refills may take up to 3 business days to process. I will use one pharmacy to fill all my opioid and other controlled substance prescriptions. I will notify the clinic about any changes to my insurance or medication  availability.    7. I am responsible for my prescriptions. If the medicine/prescription is lost, stolen or destroyed, it will not be replaced. I also agree not to share controlled substance medicines with anyone.    8. I am aware I should not use any illegal or recreational drugs. I agree not to drink alcohol unless my care team says I can.       9. If I enroll in the [place] Medical Cannabis program, I will tell my care team prior to my next refill.     10. I will tell my care team right away if I become pregnant, have a new medical problem treated outside of my regular clinic, or have a change in my medications.    11. I understand that this medicine can affect my thinking, judgment and reaction time. Alcohol and drugs affect the brain and body, which can affect the safety of my driving. Being under the influence of alcohol or drugs can affect my decision-making, behaviors, personal safety, and the safety of others. Driving while impaired (DWI) can occur if a person is driving, operating, or in physical control of a car, motorcycle, boat, snowmobile, ATV, motorbike, off-road vehicle, or any other motor vehicle ([place] Statute 169A.20). I understand the risk if I choose to drive or operate any vehicle or machinery.    I understand that if I do not follow any of the conditions above, my prescriptions or treatment may be stopped or changed.          Opioids  What You Need to Know    What are opioids?   Opioids are pain medicines that must be prescribed by a doctor. They are also known as narcotics.     Examples are:   1. morphine (MS Contin, Areli)  2. oxycodone (Oxycontin)  3. oxycodone and acetaminophen (Percocet)  4. hydrocodone and acetaminophen (Vicodin, Norco)   5. fentanyl patch (Duragesic)   6. hydromorphone (Dilaudid)   7. methadone  8. codeine (Tylenol #3)     What do opioids do well?   Opioids are best for severe short-term pain such as after a surgery or injury. They may work well for cancer pain. They may  help some people with long-lasting (chronic) pain.     What do opioids NOT do well?   Opioids never get rid of pain entirely, and they don t work well for most patients with chronic pain. Opioids don t reduce swelling, one of the causes of pain.                                    Other ways to manage chronic pain and improve function include:       Treat the health problem that may be causing pain    Anti-inflammation medicines, which reduce swelling and tenderness, such as ibuprofen (Advil, Motrin) or naproxen (Aleve)    Acetaminophen (Tylenol)    Antidepressants and anti-seizure medicines, especially for nerve pain    Topical treatments such as patches or creams    Injections or nerve blocks    Chiropractic or osteopathic treatment    Acupuncture, massage, deep breathing, meditation, visual imagery, aromatherapy    Use heat or ice at the pain site    Physical therapy     Exercise    Stop smoking    Take part in therapy       Risks and side effects     Talk to your doctor before you start or decide to keep taking opioids. Possible side effects include:      Lowering your breathing rate enough to cause death    Overdose, including death, especially if taking higher than prescribed doses    Worse depression symptoms; less pleasure in things you usually enjoy    Feeling tired or sluggish    Slower thoughts or cloudy thinking    Being more sensitive to pain over time; pain is harder to control    Trouble sleeping or restless sleep    Changes in hormone levels (for example, less testosterone)    Changes in sex drive or ability to have sex    Constipation    Unsafe driving    Itching and sweating    Dizziness    Nausea, throwing up and dry mouth    What else should I know about opioids?    Opioids may lead to dependence, tolerance, or addiction.      Dependence means that if you stop or reduce the medicine too quickly, you will have withdrawal symptoms. These include loose poop (diarrhea), jitters, flu-like symptoms,  nervousness and tremors. Dependence is not the same as addiction.                       Tolerance means needing higher doses over time to get the same effect. This may increase the chance of serious side effects.      Addiction is when people improperly use a substance that harms their body, their mind or their relations with others. Use of opiates can cause a relapse of addiction if you have a history of drug or alcohol abuse.      People who have used opioids for a long time may have a lower quality of life, worse depression, higher levels of pain and more visits to doctors.    You can overdose on opioids. Take these steps to lower your risk of overdose:    1. Recognize the signs:  Signs of overdose include decrease or loss of consciousness (blackout), slowed breathing, trouble waking up and blue lips. If someone is worried about overdose, they should call 911.    2. Talk to your doctor about Narcan (naloxone).   If you are at risk for overdose, you may be given a prescription for Narcan. This medicine very quickly reverses the effects of opioids.   If you overdose, a friend or family member can give you Narcan while waiting for the ambulance. They need to know the signs of overdose and how to give Narcan.     3. Don't use alcohol or street drugs.   Taking them with opioids can cause death.    4. Do not take any of these medicines unless your doctor says it s OK. Taking these with opioids can cause death:    Benzodiazepines, such as lorazepam (Ativan), alprazolam (Xanax) or diazepam (Valium)    Muscle relaxers, such as cyclobenzaprine (Flexeril)    Sleeping pills like zolpidem (Ambien)     Other opioids      How to keep you and other people safe while taking opioids:    1. Never share your opioids with others.  Opioid medicines are regulated by the Drug Enforcement Agency (REENA). Selling or sharing medications is a criminal act.    2. Be sure to store opioids in a secure place, locked up if possible. Young children  can easily swallow them and overdose.    3. When you are traveling with your medicines, keep them in the original bottles. If you use a pill box, be sure you also carry a copy of your medicine list from your clinic or pharmacy.    4. Safe disposal of opioids    Most pharmacies have places to get rid of medicine, called disposal kiosks. Medicine disposal options are also available in every [facility]. Search your county and  medication disposal  to find more options. You can find more details at:  [email or web address]     I agree that my provider, clinic care team, and pharmacy may work with any city, state or federal law enforcement agency that investigates the misuse, sale, or other diversion of my controlled medicine. I will allow my provider to discuss my care with, or share a copy of, this agreement with any other treating provider, pharmacy or emergency room where I receive care.    I have read this agreement and have asked questions about anything I did not understand.    _______________________________________________________  Patient Signature - July Green _____________________                   Date     _______________________________________________________  Provider Signature - Anaya Hernandez MD   _____________________                   Date     _______________________________________________________  Witness Signature (required if provider not present while patient signing)   _____________________                   Date

## 2023-03-20 NOTE — ASSESSMENT & PLAN NOTE
Significant disability with severe right knee osteoarthritis  She is scheduled for a total knee replacement through [facility] orthopedics in early summer.  Encouraged physical therapy in the interim. Intial pain med intake completed  UDS and CSA to be completed today  15 pills of hydrocodone 5-325mg Per month through June 2023.   Can discontinue following her recovery from surgery   reviewed. No concerns  Advised use sparingly. Can use NSADs as tolerated  Will request records from [facility] ortho to get results of most recent x-rays

## 2023-03-20 NOTE — PROGRESS NOTES
Assessment & Plan   Problem List Items Addressed This Visit     Primary osteoarthritis of right knee    Relevant Medications    HYDROcodone-acetaminophen (NORCO) 5-325 MG tablet    Other Relevant Orders    Physical Therapy Referral    Other chronic pain - Primary     Significant disability with severe right knee osteoarthritis  She is scheduled for a total knee replacement through [facility] orthopedics in early summer.  Encouraged physical therapy in the interim. Intial pain med intake completed  UDS and CSA to be completed today  15 pills of hydrocodone 5-325mg Per month through June 2023.   Can discontinue following her recovery from surgery   reviewed. No concerns  Advised use sparingly. Can use NSADs as tolerated  Will request records from [facility] ortho to get results of most recent x-rays           Relevant Medications    HYDROcodone-acetaminophen (NORCO) 5-325 MG tablet    Other Relevant Orders    Physical Therapy Referral    Urine Drugs of Abuse Screen Panel 13      Disability parking forms signs - short term 6 months     Return in about 3 months (around 6/20/2023).    Anaya Hernandez MD  [facility] GISELLE Mcdowell is a 62 year old, presenting for the following health issues:  Knee Pain      Knee Pain    History of Present Illness       Reason for visit:  Right knee is swollen and achy    She eats 0-1 servings of fruits and vegetables daily.She consumes 1 sweetened beverage(s) daily.She exercises with enough effort to increase her heart rate 9 or less minutes per day.  She exercises with enough effort to increase her heart rate 3 or less days per week.   She is taking medications regularly.       Pain History:  When did you first notice your pain? - Acute Pain   Have you seen anyone else for your pain? Yes - Ortho  Where in your body do you have pain? Musculoskeletal problem/pain  Onset/Duration: November 2022  Description  Location: knee - right  Joint Swelling:  YES  Redness: No  Pain: YES  Warmth: No  Intensity:  moderate  Progression of Symptoms:  same  Accompanying signs and symptoms:   Fevers: No  Numbness/tingling/weakness: No  History  Trauma to the area: YES  Recent illness:  No  Previous similar problem: Yes  Previous evaluation:  YES  Precipitating or alleviating factors:  Aggravating factors include: standing, walking, climbing stairs and lifting  Therapies tried and outcome: rest/inactivity, heat, ice, immobilization, massage, support wrap, acetaminophen, Ibuprofen and ortho doctor      PDMP Review       Value Time User    State PDMP site checked  Yes 3/20/2023 10:55 AM Anaya Hernandez MD        Last [facility] website verification:  done on 3/20/2023   [email or web address]  Pain History:  When did you first notice your pain? - Chronic Pain   Have you seen this provider for your pain in the past? No   Where in your body do your have pain? Right knee pain  Are you seeing anyone else for your pain? Yes - Orthopedics at [facility] orthopedics  What makes your pain better? Lifting the right knee above and supporting it with Ace bandage  What makes your pain worse? Walking, bending makes worse  How has pain affected your ability to work? Unable to work due to pain (not able to get a job as nobody wants to hire me)  What type of work do you or did you do? Works at the [employer]  Who lives in your household? Daughter and 2 cats            OPIOID RISK TOOL TOTAL SCORE 3/20/2023   Total Score 0     Low Risk (0-3)  Moderate Risk (4-7)  High Risk (>8)  RIOSORD Total Score 3/20/2023   RIOSORD Total Score 0     Average probability of overdose or serious opioid-induced respiratory depression in the next six months:   Points    Risk   0-4    2%   5-7    5%   8-9    7%   10-17    15%   18-25    30%   26-41    55%   42    83%  No flowsheet data found.  No flowsheet data found.  PHQ-9 SCORE 8/30/2019 3/20/2023   PHQ-9 Total Score MyChart 3 (Minimal depression)  "-   PHQ-9 Total Score 3 4     REGGIE-7 SCORE 3/20/2023   Total Score 1     PEG Score 3/20/2023   PEG Total Score 4.67     Chronic Pain - Initial Assessment:    How would you describe your pain? Dull ache in the right knee  Have you had any recent changes to the severity or character of your pain?stable. Planning for Total knee replacement in early summer  Is there an underlying cause that has been identified? Severe osteoarthritis  Has your ability to work or do daily activities changed recently because of your pain? yes  Which of these pain treatments have you tried? Other: NSAIDS. Declines therapy .   Previous medication treatments:  NSAIDs - sulindac (Clinoril)- have reflux and stomach ache      Review of Systems   Constitutional, HEENT, cardiovascular, pulmonary, gi and gu systems are negative, except as otherwise noted.      Objective    /64   Pulse 88   Temp 97.9  F (36.6  C) (Temporal)   Resp 16   Ht 1.626 m (5' 4\")   Wt 64.9 kg (143 lb)   LMP 05/12/2011 (Approximate)   SpO2 94%   BMI 24.55 kg/m    Body mass index is 24.55 kg/m .  Physical Exam   GENERAL: healthy, alert and no distress  NECK: no adenopathy, no asymmetry, masses, or scars and thyroid normal to palpation  RESP: lungs clear to auscultation - no rales, rhonchi or wheezes  CV: regular rate and rhythm, normal S1 S2, no S3 or S4, no murmur, click or rub, no peripheral edema and peripheral pulses strong  MS: Limited ROM with flexion beyond 60 degrees. TTP along the medial joint line . Minimal effusion on the right knee. Antalgic gait          "

## 2023-03-26 NOTE — PROGRESS NOTES
Physical Therapy Initial Evaluation    Therapist Assessment: July Green is a 62 year old female patient presenting to Physical Therapy with R knee pain. Patient demonstrates decreased R knee active and passive flexion, fear avoidance pattern with movement (concerned about increased swelling with stairs and repeated knee bending), decreased global hip strength, and antalgic gait with single point cane in L UE. Signs and symptoms are consistent with R knee OA. These impairments limit their ability to ambulate for extended periods of time with least restrictive to no assistive device, ascend and descend stairs in a normal reciprocal pattern without increased swelling, and go to work (extended periods of standing). Skilled PT services are necessary in order to reduce impairments and improve independent function.    Subjective:  The history is provided by the patient.   Therapist Generated HPI Evaluation  Problem details: Patient reports to outpatient physical therapy with R knee pain that started November 2022 when she traveled to [place] and walked long distances/for extended periods of time each day. She walked most days and towards the end of the week started to notice increased pain in her R knee. She has the most pain with standing, walking, stair ambulation and squatting and is scheduled for a R TKA through [facility] in early summer. Patient is concerned about the swelling that happens when she does an increased amount of activity and has been wearing an ACE bandage (above and below her R knee) to try to help with the swelling. She has also been elevating her R LE when she sits on the couch (occasionally above heart level, but sometimes just propped in front of her). Patient has been having a difficult time finding a job since the job she was interested in required extended periods of time standing, which is difficult for patient at this time due to pain and swelling. Patient recently applied to a job and had to  walk up and down stairs repeatedly which resulted in significant swelling the next day. She is having a difficult time finding work due to her present condition, but is hoping she can once her knee is better.     Goals:  1) stairs without pain and increased swelling, resume reciprocal pattern  2) squat without swelling and with less pain  3) ambulation for extended periods of time   4) sleeping without pain.         Type of problem:  Right knee.    This is a chronic condition.  Condition occurred with:  Repetition/overuse.  Where condition occurred: in the community.  Patient reports pain:  Lower leg and medial.  Pain is described as aching   Pain is worse during the night and worse during the day.  Since onset symptoms are unchanged.  Associated symptoms:  Loss of motion/stiffness. Symptoms are exacerbated by activity, ascending stairs and standing  and relieved by heat and ice (single point cane L hand; elevation).  Special tests included:  X-ray.    Restrictions due to condition include:  Currently not working due to present treatment.      Patient Health History  July Green being seen for R knee therapy.     Date of Onset: Nov 2022.   Problem occurred: walking daily for a week and knee swelled up   Pain is reported as 1/10 on pain scale.  General health as reported by patient is good.  Pertinent medical history includes: menopausal and osteoarthritis.        Surgeries include:  Other (carpal tunnel).    Current medications:  Anti-inflammatory.    Current occupation is none.   Primary job tasks include:  Prolonged sitting.                                    Objective:    Gait:  Antalgic gait with decreased time on R LE. Use of single point cane in L hand. Once cane adjusted, patient able to put increased weight through L UE for support while ambulating, improved upright posture.   Gait Type:  Antalgic   Assistive Devices:  Cane                                                   Hip Evaluation    Hip  Strength:        Abduction:  Right: 4/5   -   Pain:                           Knee Evaluation:  ROM:  Strength wnl knee: SLR: 0 deg extensor lag.   AROM      Extension: Left:    Right:  0  Flexion: Left:   Right: 88 (fear of swelling if bend too far per patient report)    Pain: +    Strength:     Extension:  Left:/5  Strong/pain free  Pain:      Right: 4/5    Pain:-  Flexion:  Left:/5  Strong/painful  Pain:      Right: 4/5    Pain:-      Quad Set Right:  Fair    Pain: +/-  Ligament Testing:  Not Assessed                Special Tests: Not Assessed        Edema:  Not Assessed      Functional Testing:  : DL squat: weight shift L, decreased depth of squat. DL heel raise: decreased heel height bilaterally.                  General     ROS    Assessment/Plan:    Patient is a 62 year old female with right side knee complaints.    Patient has the following significant findings with corresponding treatment plan.                Diagnosis 1:  R knee pain  Pain -  hot/cold therapy, electric stimulation, mechanical traction, manual therapy, STS, splint/taping/bracing/orthotics, self management, education, directional preference exercise and home program  Decreased ROM/flexibility - manual therapy, therapeutic exercise, therapeutic activity and home program  Decreased joint mobility - manual therapy, therapeutic exercise, therapeutic activity and home program  Decreased strength - therapeutic exercise, therapeutic activities and home program  Impaired balance - neuro re-education, therapeutic activities, adaptive equipment/assistive device and home program  Edema - vasopneumatics, electric stimulation, cold therapy, cryocuff and self management/home program  Impaired gait - gait training, assistive devices and home program  Impaired muscle performance - neuro re-education and home program  Decreased function - therapeutic activities and home program  Impaired posture - neuro re-education, therapeutic activities and home  program    Therapy Evaluation Codes:     Cumulative Therapy Evaluation is: Low complexity.    Previous and current functional limitations:  (See Goal Flow Sheet for this information)    Short term and Long term goals: (See Goal Flow Sheet for this information)     Communication ability:  Patient appears to be able to clearly communicate and understand verbal and written communication and follow directions correctly.  Treatment Explanation - The following has been discussed with the patient:   RX ordered/plan of care  Anticipated outcomes  Possible risks and side effects  This patient would benefit from PT intervention to resume normal activities.   Rehab potential is good.    Frequency:  1 X week, once daily progressing to 1 x every other week  Duration:  for 2-3 months  Discharge Plan:  Achieve all LTG.  Independent in home treatment program.  Reach maximal therapeutic benefit.    Please refer to the daily flowsheet for treatment today, total treatment time and time spent performing 1:1 timed codes.

## 2023-03-27 ENCOUNTER — THERAPY VISIT (OUTPATIENT)
Dept: PHYSICAL THERAPY | Facility: CLINIC | Age: 63
End: 2023-03-27
Attending: FAMILY MEDICINE
Payer: COMMERCIAL

## 2023-03-27 DIAGNOSIS — M25.561 RIGHT KNEE PAIN: Primary | ICD-10-CM

## 2023-03-27 DIAGNOSIS — G89.29 OTHER CHRONIC PAIN: ICD-10-CM

## 2023-03-27 DIAGNOSIS — M17.11 PRIMARY OSTEOARTHRITIS OF RIGHT KNEE: ICD-10-CM

## 2023-03-27 PROCEDURE — 97110 THERAPEUTIC EXERCISES: CPT | Mod: 59

## 2023-03-27 PROCEDURE — 97161 PT EVAL LOW COMPLEX 20 MIN: CPT | Mod: GP

## 2023-03-27 PROCEDURE — 97530 THERAPEUTIC ACTIVITIES: CPT | Mod: GP

## 2023-03-27 ASSESSMENT — ACTIVITIES OF DAILY LIVING (ADL)
SQUAT: I AM UNABLE TO DO THE ACTIVITY
WALK: ACTIVITY IS SOMEWHAT DIFFICULT
GO DOWN STAIRS: ACTIVITY IS FAIRLY DIFFICULT
SIT WITH YOUR KNEE BENT: ACTIVITY IS FAIRLY DIFFICULT
WEAKNESS: I DO NOT HAVE THE SYMPTOM
RISE FROM A CHAIR: ACTIVITY IS MINIMALLY DIFFICULT
KNEEL ON THE FRONT OF YOUR KNEE: I AM UNABLE TO DO THE ACTIVITY
STIFFNESS: THE SYMPTOM AFFECTS MY ACTIVITY MODERATELY
KNEE_ACTIVITY_OF_DAILY_LIVING_SUM: 33
PAIN: THE SYMPTOM AFFECTS MY ACTIVITY SLIGHTLY
SWELLING: THE SYMPTOM PREVENTS ME FROM ALL DAILY ACTIVITIES
GO UP STAIRS: ACTIVITY IS FAIRLY DIFFICULT
RAW_SCORE: 33
AS_A_RESULT_OF_YOUR_KNEE_INJURY,_HOW_WOULD_YOU_RATE_YOUR_CURRENT_LEVEL_OF_DAILY_ACTIVITY?: ABNORMAL
LIMPING: THE SYMPTOM AFFECTS MY ACTIVITY MODERATELY
KNEE_ACTIVITY_OF_DAILY_LIVING_SCORE: 47.14
GIVING WAY, BUCKLING OR SHIFTING OF KNEE: I HAVE THE SYMPTOM BUT IT DOES NOT AFFECT MY ACTIVITY
HOW_WOULD_YOU_RATE_THE_OVERALL_FUNCTION_OF_YOUR_KNEE_DURING_YOUR_USUAL_DAILY_ACTIVITIES?: ABNORMAL
STAND: ACTIVITY IS MINIMALLY DIFFICULT
HOW_WOULD_YOU_RATE_THE_CURRENT_FUNCTION_OF_YOUR_KNEE_DURING_YOUR_USUAL_DAILY_ACTIVITIES_ON_A_SCALE_FROM_0_TO_100_WITH_100_BEING_YOUR_LEVEL_OF_KNEE_FUNCTION_PRIOR_TO_YOUR_INJURY_AND_0_BEING_THE_INABILITY_TO_PERFORM_ANY_OF_YOUR_USUAL_DAILY_ACTIVITIES?: 35

## 2023-03-28 NOTE — PROGRESS NOTES
[facility]    OUTPATIENT Physical Therapy ORTHOPEDIC EVALUATION  PLAN OF TREATMENT FOR OUTPATIENT REHABILITATION  (COMPLETE FOR INITIAL CLAIMS ONLY)  Patient's Last Name, First Name, M.I.  YOB: 1960  July Green    Provider s Name:  [facility]   Medical Record No.  [id number]   Start of Care Date:  03/27/23   Onset Date:   02/27/23   Treatment Diagnosis:  R knee pain Medical Diagnosis:     Other chronic pain  Primary osteoarthritis of right knee  Right knee pain       Goals:     03/27/23 0500   Body Part   Goals listed below are for R knee   Goal #1   Goal #1 stairs   Previous Functional Level No restrictions;Ascend/descend stairs;in a normal reciprocal pattern   Performance Level 0/10 pain   Current Functional Level Ascend/descend stairs;one step at a time;with a railing   Performance Level 3/10 pain and fear of swelling   STG Target Performance Ascend stairs;in a normal reciprocal pattern;with a railing   Performance Level 1/10 pain or less   Rationale to enter/leave the house safely;to reach upper level of home safely;for safe community ambulaton   Due Date 05/01/23   LTG Target Performance Descend stairs;in a normal reciprocal pattern;with railing   Performance Level 1/10 pain or less   Rationale to enter/leave the house safely;to reach upper level of home safely;for safe community ambulaton   Due Date 06/05/23   Goal #2   Goal #2 ambulation   Previous Functional Level No restrictions;Minutes patient could walk   Performance Level 30 + min no assistive device   Current Functional Level Minutes patient can walk;with cane   Performance Level 10 min   STG Target Performance Minutes patient will be able to walk;with cane   Performance Level 15 min   Rationale for safe household ambulation;for safe outdoor household ambulation   Due Date 05/01/23    LTG  Target Performance Minutes patient will be able to  walk  (with least restrictive or no assistive device)   Performance Level 20 + min   Rationale for safe household ambulation;for safe outdoor household ambulation;for safe community ambulation   Due Date 06/05/23         Therapy Frequency:  1x/wk progressing to 1 x every other week  Predicted Duration of Therapy Intervention:  2-3 months    Marija Kolb, PT                 I CERTIFY THE NEED FOR THESE SERVICES FURNISHED UNDER        THIS PLAN OF TREATMENT AND WHILE UNDER MY CARE     (Physician attestation of this document indicates review and certification of the therapy plan).                     Certification Date From:  03/27/23   Certification Date To:  06/24/23    Referring Provider:  Anaya Hernandez    Initial Assessment        See Epic Evaluation SOC Date: 03/27/23

## 2023-04-03 ENCOUNTER — THERAPY VISIT (OUTPATIENT)
Dept: PHYSICAL THERAPY | Facility: CLINIC | Age: 63
End: 2023-04-03
Payer: COMMERCIAL

## 2023-04-03 DIAGNOSIS — G89.29 CHRONIC PAIN OF RIGHT KNEE: Primary | ICD-10-CM

## 2023-04-03 DIAGNOSIS — M25.561 CHRONIC PAIN OF RIGHT KNEE: Primary | ICD-10-CM

## 2023-04-03 PROCEDURE — 97110 THERAPEUTIC EXERCISES: CPT | Mod: GP | Performed by: PHYSICAL THERAPIST

## 2023-04-13 ENCOUNTER — THERAPY VISIT (OUTPATIENT)
Dept: PHYSICAL THERAPY | Facility: CLINIC | Age: 63
End: 2023-04-13
Payer: COMMERCIAL

## 2023-04-13 DIAGNOSIS — M25.561 CHRONIC PAIN OF RIGHT KNEE: Primary | ICD-10-CM

## 2023-04-13 DIAGNOSIS — G89.29 CHRONIC PAIN OF RIGHT KNEE: Primary | ICD-10-CM

## 2023-04-13 PROCEDURE — 97110 THERAPEUTIC EXERCISES: CPT | Mod: GP | Performed by: PHYSICAL THERAPIST

## 2023-04-13 PROCEDURE — 97140 MANUAL THERAPY 1/> REGIONS: CPT | Mod: GP | Performed by: PHYSICAL THERAPIST

## 2023-04-13 NOTE — PROGRESS NOTES
Subjective:  HPI  Physical Exam                    Objective:  System    Physical Exam    General     ROS    Assessment/Plan:    SUBJECTIVE  Subjective: Pt indicates continued soreness.  Walking, driving continue to be problematic.   Current Pain level: No change   Changes in function:  None     Adverse reaction to treatment or activity:  None    OBJECTIVE  Objective: Pt ambulates without device, antalgic pattern.  AROM flexion 65 upon arrival, 82 afterward.     ASSESSMENT  July continues to require intervention to meet STG and LTG's: PT  No change of symptoms has been noted.  Response to therapy has shown lack of progress in  function  Progress made towards STG/LTG?  None    PLAN  Able to increase motion during session but not progressing overall.  Concern over lack of progression with TKA planned.    PTA/ATC plan:  N/A    Please refer to the daily flowsheet for treatment today, total treatment time and time spent performing 1:1 timed codes.

## 2023-04-20 ENCOUNTER — THERAPY VISIT (OUTPATIENT)
Dept: PHYSICAL THERAPY | Facility: CLINIC | Age: 63
End: 2023-04-20
Payer: COMMERCIAL

## 2023-04-20 DIAGNOSIS — M25.561 CHRONIC PAIN OF RIGHT KNEE: Primary | ICD-10-CM

## 2023-04-20 DIAGNOSIS — G89.29 CHRONIC PAIN OF RIGHT KNEE: Primary | ICD-10-CM

## 2023-04-20 PROCEDURE — 97140 MANUAL THERAPY 1/> REGIONS: CPT | Mod: GP | Performed by: PHYSICAL THERAPIST

## 2023-04-20 PROCEDURE — 97110 THERAPEUTIC EXERCISES: CPT | Mod: GP | Performed by: PHYSICAL THERAPIST

## 2023-05-10 ENCOUNTER — THERAPY VISIT (OUTPATIENT)
Dept: PHYSICAL THERAPY | Facility: CLINIC | Age: 63
End: 2023-05-10
Payer: COMMERCIAL

## 2023-05-10 DIAGNOSIS — G89.29 CHRONIC PAIN OF RIGHT KNEE: Primary | ICD-10-CM

## 2023-05-10 DIAGNOSIS — M25.561 CHRONIC PAIN OF RIGHT KNEE: Primary | ICD-10-CM

## 2023-05-10 PROCEDURE — 97110 THERAPEUTIC EXERCISES: CPT | Mod: GP | Performed by: PHYSICAL THERAPIST

## 2023-05-10 PROCEDURE — 97140 MANUAL THERAPY 1/> REGIONS: CPT | Mod: GP | Performed by: PHYSICAL THERAPIST

## 2023-05-10 NOTE — PROGRESS NOTES
"Subjective:  HPI  Physical Exam                    Objective:  System    Physical Exam    General     ROS    Assessment/Plan:    SUBJECTIVE  Subjective: Pt notes walking is better and pain is generally down.  Still having difficulty bending the knee as \"it just doesn't want to go\"   Current Pain level: 0/10   Changes in function:  Yes (See Goal flowsheet attached for changes in current functional level)     Adverse reaction to treatment or activity:  None    OBJECTIVE  Objective: Pt ambulates without device without gross asymmetry.  AROM 95 deg flexion and pt reports sitting is easier as a result.  AROM 102 flexion afterward.     ASSESSMENT  July continues to require intervention to meet STG and LTG's: PT  Pleased to note improved function, gait, and even some ROM although is still limted.  Response to therapy has shown an improvement in  ROM  and function  Progress made towards STG/LTG?  Yes (See Goal flowsheet attached for updates on achievement of STG and LTG)    PLAN  Continue current treatment plan until patient demonstrates readiness to progress to higher level exercises.    PTA/ATC plan:  N/A    Please refer to the daily flowsheet for treatment today, total treatment time and time spent performing 1:1 timed codes.        "

## 2023-05-24 ENCOUNTER — THERAPY VISIT (OUTPATIENT)
Dept: PHYSICAL THERAPY | Facility: CLINIC | Age: 63
End: 2023-05-24
Payer: COMMERCIAL

## 2023-05-24 DIAGNOSIS — M25.561 CHRONIC PAIN OF RIGHT KNEE: Primary | ICD-10-CM

## 2023-05-24 DIAGNOSIS — G89.29 CHRONIC PAIN OF RIGHT KNEE: Primary | ICD-10-CM

## 2023-05-24 PROCEDURE — 97110 THERAPEUTIC EXERCISES: CPT | Mod: GP | Performed by: PHYSICAL THERAPIST

## 2023-05-24 PROCEDURE — 97140 MANUAL THERAPY 1/> REGIONS: CPT | Mod: GP | Performed by: PHYSICAL THERAPIST

## 2023-05-24 NOTE — PROGRESS NOTES
05/24/23 0500   Appointment Info   Signing clinician's name / credentials Vince Shetty PT   Total/Authorized Visits 10   Visits Used 6   Medical Diagnosis Other chronic pain  Primary osteoarthritis of right knee   PT Tx Diagnosis R knee pain   Quick Adds Certification   Progress Note/Certification   Start of Care Date 03/27/23   Onset of illness/injury or Date of Surgery 11/01/22   Certification date from 03/27/23   Certification date to 06/24/23   Progress Note Completed Date 03/27/23   GOALS   PT Goals 4;3;2   PT Goal 1   Goal Description Ascend stairs; in a normal reciprocal pattern; with a railing 1/10 pain or less   Rationale to maximize safety and independence with performance of ADLs and functional tasks   Goal Progress Pt notes ascent easier than descent but can go reciprocally as long as she goes slowly.   Target Date 05/01/23   Date Met 05/10/23   PT Goal 2   Goal Description Descend stairs; in a normal reciprocal pattern; with railing 1/10 pain or less   Rationale to maximize safety and independence with performance of ADLs and functional tasks   Goal Progress Pt notes ascent easier than descent but can go reciprocally as long as she goes slowly.   Target Date 06/05/23   PT Goal 3   Goal Description Minutes patient will be able to walk; with cane: 15 min   Rationale to maximize safety and independence with performance of ADLs and functional tasks   Goal Progress Pt ambulating without device.   Target Date 05/01/23   Date Met 05/10/23   PT Goal 4   Goal Description Minutes patient will be able to  walk with least restrictive assistive device: 20 + min   Rationale to maximize safety and independence with performance of ADLs and functional tasks   Goal Progress Pt ambulating without device.   Target Date 06/05/23   Date Met 05/24/23   Subjective Report   Subjective Report Pt reports generally being sore on the right side today and plans further evaluation at the shoulder.  Feels like she is walking  "\"almost normal.\"  Knee remains sore but she feels like she is doing more than she used to do.  Pt has rescheduled surgery from late June to August 21.   Objective Measures   Objective Measures Objective Measure 1;Objective Measure 2   Objective Measure 1   Objective Measure ROM   Details AROM flexion upon arrival 98, 105 afterward.   Objective Measure 2   Objective Measure Gait   Details Pt ambulates without device.   Treatment Interventions (PT)   Interventions Therapeutic Procedure/Exercise;Manual Therapy   Therapeutic Procedure/Exercise   Therapeutic Procedures: strength, endurance, ROM, flexibillity minutes (17983) 25   Ther Proc 1 Recumbent bike   Ther Proc 1 - Details 5 min self selected speed to warm up.  Pt able to complete full revolutions today.   Skilled Intervention Progression of HEP, especially with lens toward independent management.   Patient Response/Progress Improving tolerance to HEP.   PTRx Ther Proc 1 Heel Slides   PTRx Ther Proc 1 - Details 05/24 Encourage consistency to increase flexion ROM.   PTRx Ther Proc 2 Passive Knee Extension Stretch   PTRx Ther Proc 2 - Details 05/24 OK to continue anytime throughout the day.   PTRx Ther Proc 3 Short Arc Knee Extension   PTRx Ther Proc 3 - Details 04/20 OK to take out given progression.   PTRx Ther Proc 4 Toe Raises   PTRx Ther Proc 4 - Details 05/24 Pt notes this is getting easier.  OK to shift weight toward the right side.   PTRx Ther Proc 5 Standing Gastroc Stretch   PTRx Ther Proc 5 - Details 04/20 OK to continue.   PTRx Ther Proc 6 Hip Flexion Straight Leg Raise   PTRx Ther Proc 6 - Details 04/20 OK to take out given progression.   PTRx Ther Proc 7 Hip AROM Standing Abduction   PTRx Ther Proc 7 - Details 05/24 Continue.   PTRx Ther Proc 8 Knee Bends   PTRx Ther Proc 8 - Details No Notes   PTRx Ther Proc 9 Forward Step   PTRx Ther Proc 9 - Details 05/24 Can gradually increase height as can control it.   PTRx Ther Proc 10 Squat   PTRx Ther Proc 10 - " Details 05/24 Pleased that pt is noting this to be easier.  Emphasize consistency.   PTRx Ther Proc 11 Soleus Stretch on Chair   PTRx Ther Proc 11 - Details 05/24 Continue with emphasis to end range.   PTRx Ther Proc 12 Side Lunge   PTRx Ther Proc 12 - Details 05/24 Added today.  Can vary the intensity through length of step how hard she pushes off and depth of flexion.  She noted this actually felt quite good to do.   PTRx Ther Proc 13 Functional Lunge Forward   PTRx Ther Proc 13 - Details 05/24 Added today.  Can vary the intensity through length of step how hard she pushes off and depth of flexion.  She noted this actually felt quite good to do.   Therapeutic Activity   PTRx Ther Act 1 Icing   PTRx Ther Act 1 - Details patient education on:Ice/compression/elevation: 20 min with 60 min in between ice sessions for pain reliefelevate leg above heart level for reducing swelling   Manual Therapy   Manual Therapy: Mobilization, MFR, MLD, friction massage minutes (25055) 10   Manual Therapy Manual Therapy 2   Manual Therapy 1 Posterior tibial glides   Manual Therapy 1 - Details Sitting, grade III without discomfort.   Skilled Intervention Manual interventions to increase flexion ROM.   Patient Response/Progress See note above.   Manual Therapy 2 Patellar glides   Manual Therapy 2 - Details Seated, medial / lateral and superior / inferior without discomfort.   Total Session Time   Timed Code Treatment Minutes 35   Total Treatment Time (sum of timed and untimed services) 35         DISCHARGE  Reason for Discharge: Pt has targeted surgery for August and feels comfortable continuing HEP independently until then.    Equipment Issued: N/A    Discharge Plan: Patient to continue home program.    Referring Provider:  Anaya Hernandez

## 2023-05-30 ENCOUNTER — TRANSFERRED RECORDS (OUTPATIENT)
Dept: HEALTH INFORMATION MANAGEMENT | Facility: CLINIC | Age: 63
End: 2023-05-30
Payer: COMMERCIAL

## 2023-08-14 ENCOUNTER — OFFICE VISIT (OUTPATIENT)
Dept: FAMILY MEDICINE | Facility: CLINIC | Age: 63
End: 2023-08-14
Payer: COMMERCIAL

## 2023-08-14 ENCOUNTER — LAB (OUTPATIENT)
Dept: FAMILY MEDICINE | Facility: CLINIC | Age: 63
End: 2023-08-14

## 2023-08-14 VITALS
WEIGHT: 150 LBS | BODY MASS INDEX: 26.58 KG/M2 | OXYGEN SATURATION: 97 % | DIASTOLIC BLOOD PRESSURE: 62 MMHG | RESPIRATION RATE: 16 BRPM | HEART RATE: 60 BPM | HEIGHT: 63 IN | SYSTOLIC BLOOD PRESSURE: 120 MMHG | TEMPERATURE: 97.5 F

## 2023-08-14 DIAGNOSIS — Z12.31 VISIT FOR SCREENING MAMMOGRAM: ICD-10-CM

## 2023-08-14 DIAGNOSIS — M17.11 PRIMARY OSTEOARTHRITIS OF RIGHT KNEE: ICD-10-CM

## 2023-08-14 DIAGNOSIS — Z01.818 PREOP GENERAL PHYSICAL EXAM: Primary | ICD-10-CM

## 2023-08-14 DIAGNOSIS — Z12.11 SPECIAL SCREENING FOR MALIGNANT NEOPLASMS, COLON: ICD-10-CM

## 2023-08-14 LAB
ANION GAP SERPL CALCULATED.3IONS-SCNC: 11 MMOL/L (ref 7–15)
BUN SERPL-MCNC: 9.7 MG/DL (ref 8–23)
CALCIUM SERPL-MCNC: 9.7 MG/DL (ref 8.8–10.2)
CHLORIDE SERPL-SCNC: 103 MMOL/L (ref 98–107)
CREAT SERPL-MCNC: 0.73 MG/DL (ref 0.51–0.95)
DEPRECATED HCO3 PLAS-SCNC: 28 MMOL/L (ref 22–29)
ERYTHROCYTE [DISTWIDTH] IN BLOOD BY AUTOMATED COUNT: 12.6 % (ref 10–15)
GFR SERPL CREATININE-BSD FRML MDRD: >90 ML/MIN/1.73M2
GLUCOSE SERPL-MCNC: 110 MG/DL (ref 70–99)
HCT VFR BLD AUTO: 41.1 % (ref 35–47)
HGB BLD-MCNC: 13.5 G/DL (ref 11.7–15.7)
MCH RBC QN AUTO: 29.2 PG (ref 26.5–33)
MCHC RBC AUTO-ENTMCNC: 32.8 G/DL (ref 31.5–36.5)
MCV RBC AUTO: 89 FL (ref 78–100)
PLATELET # BLD AUTO: 253 10E3/UL (ref 150–450)
POTASSIUM SERPL-SCNC: 4.1 MMOL/L (ref 3.4–5.3)
RBC # BLD AUTO: 4.62 10E6/UL (ref 3.8–5.2)
SODIUM SERPL-SCNC: 142 MMOL/L (ref 136–145)
WBC # BLD AUTO: 7.3 10E3/UL (ref 4–11)

## 2023-08-14 PROCEDURE — 85027 COMPLETE CBC AUTOMATED: CPT | Performed by: PHYSICIAN ASSISTANT

## 2023-08-14 PROCEDURE — 99214 OFFICE O/P EST MOD 30 MIN: CPT | Mod: 25 | Performed by: PHYSICIAN ASSISTANT

## 2023-08-14 PROCEDURE — 36415 COLL VENOUS BLD VENIPUNCTURE: CPT | Performed by: PHYSICIAN ASSISTANT

## 2023-08-14 PROCEDURE — 80048 BASIC METABOLIC PNL TOTAL CA: CPT | Performed by: PHYSICIAN ASSISTANT

## 2023-08-14 PROCEDURE — 93000 ELECTROCARDIOGRAM COMPLETE: CPT | Performed by: PHYSICIAN ASSISTANT
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
## 2023-08-14 NOTE — PATIENT INSTRUCTIONS
For informational purposes only. Not to replace the advice of your health care provider. Copyright   2003,  [facility]. All rights reserved. Clinically reviewed by Mary Gordillo MD. [place] [id number] - REV .  Preparing for Your Surgery  Getting started  A nurse will call you to review your health history and instructions. They will give you an arrival time based on your scheduled surgery time. Please be ready to share:  Your doctor's clinic name and phone number  Your medical, surgical, and anesthesia history  A list of allergies and sensitivities  A list of medicines, including herbal treatments and over-the-counter drugs  Whether the patient has a legal guardian (ask how to send us the papers in advance)  Please tell us if you're pregnant--or if there's any chance you might be pregnant. Some surgeries may injure a fetus (unborn baby), so they require a pregnancy test. Surgeries that are safe for a fetus don't always need a test, and you can choose whether to have one.   If you have a child who's having surgery, please ask for a copy of Preparing for Your Child's Surgery.    Preparing for surgery  Within 10 to 30 days of surgery: Have a pre-op exam (sometimes called an H&P, or History and Physical). This can be done at a clinic or pre-operative center.  If you're having a , you may not need this exam. Talk to your care team.  At your pre-op exam, talk to your care team about all medicines you take. If you need to stop any medicines before surgery, ask when to start taking them again.  We do this for your safety. Many medicines can make you bleed too much during surgery. Some change how well surgery (anesthesia) drugs work.  Call your insurance company to let them know you're having surgery. (If you don't have insurance, call [phone].)  Call your clinic if there's any change in your health. This includes signs of a cold or flu (sore throat, runny nose, cough, rash, fever). It  also includes a scrape or scratch near the surgery site.  If you have questions on the day of surgery, call your hospital or surgery center.  Eating and drinking guidelines  For your safety: Unless your surgeon tells you otherwise, follow the guidelines below.  Eat and drink as usual until 8 hours before you arrive for surgery. After that, no food or milk.  Drink clear liquids until 2 hours before you arrive. These are liquids you can see through, like water, Gatorade, and Propel Water. They also include plain black coffee and tea (no cream or milk), candy, and breath mints. You can spit out gum when you arrive.  If you drink alcohol: Stop drinking it the night before surgery.  If your care team tells you to take medicine on the morning of surgery, it's okay to take it with a sip of water.  Preventing infection  Shower or bathe the night before and morning of your surgery. Follow the instructions your clinic gave you. (If no instructions, use regular soap.)  Don't shave or clip hair near your surgery site. We'll remove the hair if needed.  Don't smoke or vape the morning of surgery. You may chew nicotine gum up to 2 hours before surgery. A nicotine patch is okay.  Note: Some surgeries require you to completely quit smoking and nicotine. Check with your surgeon.  Your care team will make every effort to keep you safe from infection. We will:  Clean our hands often with soap and water (or an alcohol-based hand rub).  Clean the skin at your surgery site with a special soap that kills germs.  Give you a special gown to keep you warm. (Cold raises the risk of infection.)  Wear special hair covers, masks, gowns and gloves during surgery.  Give antibiotic medicine, if prescribed. Not all surgeries need antibiotics.  What to bring on the day of surgery  Photo ID and insurance card  Copy of your health care directive, if you have one  Glasses and hearing aids (bring cases)  You can't wear contacts during surgery  Inhaler and  eye drops, if you use them (tell us about these when you arrive)  CPAP machine or breathing device, if you use them  A few personal items, if spending the night  If you have . . .  A pacemaker, ICD (cardiac defibrillator) or other implant: Bring the ID card.  An implanted stimulator: Bring the remote control.  A legal guardian: Bring a copy of the certified (court-stamped) guardianship papers.  Please remove any jewelry, including body piercings. Leave jewelry and other valuables at home.  If you're going home the day of surgery  You must have a responsible adult drive you home. They should stay with you overnight as well.  If you don't have someone to stay with you, and you aren't safe to go home alone, we may keep you overnight. Insurance often won't pay for this.  After surgery  If it's hard to control your pain or you need more pain medicine, please call your surgeon's office.  Questions?   If you have any questions for your care team, list them here: _________________________________________________________________________________________________________________________________________________________________________ ____________________________________ ____________________________________ ____________________________________    How to Take Your Medication Before Surgery  - HOLD (do not take) NSAIDs    Please return the colon cancer screening test     To schedule your Imaging Test or Specialty Referral please call: mammogram.    [facility]   Radiology (imaging- mammogram, ultrasound, CT, MRI): [phone]  Speciality consultation [phone]  [address]  [facility] [address]    [place] [address] [place] [address]  [phone]  Specialty consultation [phone]  Colonoscopy [phone]       Other Mammogram Options:  [facility] Locations:   [place], [place], [place], [place]*, [place]*  ([facility]), [facility]*-- Call to schedule [phone]    [facility] Locations:  [place], [place], [facility] ([place], [place]*, [facility] [place] [place]*, [place], [place]-- Call to schedule [phone]     [facility] Locations:  [place]* and [facility]-- Call to schedule [phone]    *locations marked with a star have 3D mammography available

## 2023-08-14 NOTE — LETTER
August 21, 2023      July Green  [address]   [place] [address]        Dear July,    Your kidney function (serum creatinine and GFR) and electrolyte tests are normal.     Your complete blood count was normal: hemoglobin (measure of red blood cells, the test for anemia), the white blood cells (fight infection), and platelets (form plug/clot at sites of bleeding) were all normal.        Resulted Orders   CBC with platelets   Result Value Ref Range    WBC Count 7.3 4.0 - 11.0 10e3/uL    RBC Count 4.62 3.80 - 5.20 10e6/uL    Hemoglobin 13.5 11.7 - 15.7 g/dL    Hematocrit 41.1 35.0 - 47.0 %    MCV 89 78 - 100 fL    MCH 29.2 26.5 - 33.0 pg    MCHC 32.8 31.5 - 36.5 g/dL    RDW 12.6 10.0 - 15.0 %    Platelet Count 253 150 - 450 10e3/uL   Basic metabolic panel  (Ca, Cl, CO2, Creat, Gluc, K, Na, BUN)   Result Value Ref Range    Sodium 142 136 - 145 mmol/L    Potassium 4.1 3.4 - 5.3 mmol/L    Chloride 103 98 - 107 mmol/L    Carbon Dioxide (CO2) 28 22 - 29 mmol/L    Anion Gap 11 7 - 15 mmol/L    Urea Nitrogen 9.7 8.0 - 23.0 mg/dL    Creatinine 0.73 0.51 - 0.95 mg/dL    Calcium 9.7 8.8 - 10.2 mg/dL    Glucose 110 (H) 70 - 99 mg/dL    GFR Estimate >90 >60 mL/min/1.73m2         If you have any questions or concerns, please call the clinic at the number listed above.       Sincerely,      Christina Fish PA-C

## 2023-08-18 ENCOUNTER — TELEPHONE (OUTPATIENT)
Dept: FAMILY MEDICINE | Facility: CLINIC | Age: 63
End: 2023-08-18
Payer: COMMERCIAL

## 2023-08-18 NOTE — TELEPHONE ENCOUNTER
----- Message from Gilma Del Real CMA sent at 8/16/2023  8:32 AM CDT -----    ----- Message -----  From: Christina Fish PA-C  Sent: 8/16/2023   6:50 AM CDT  To:   Patient Care Team    Please call pt with the following message:    Kidney function (serum creatinine and GFR) and electrolyte tests are normal.    Complete blood count was normal: hemoglobin (measure of red blood cells, the test for anemia), the white blood cells (fight infection), and platelets (form plug/clot at sites of bleeding) were all normal.      (If pt does not have MyChart, please mail a letter with results and my comments.)  Christina Fish PA-C

## 2023-09-09 LAB — NONINV COLON CA DNA+OCC BLD SCRN STL QL: NEGATIVE

## 2023-10-12 ENCOUNTER — OFFICE VISIT (OUTPATIENT)
Dept: FAMILY MEDICINE | Facility: OTHER | Age: 63
End: 2023-10-12
Payer: COMMERCIAL

## 2023-10-12 VITALS
WEIGHT: 146.5 LBS | SYSTOLIC BLOOD PRESSURE: 102 MMHG | BODY MASS INDEX: 25.01 KG/M2 | RESPIRATION RATE: 14 BRPM | OXYGEN SATURATION: 99 % | TEMPERATURE: 98.3 F | HEART RATE: 82 BPM | DIASTOLIC BLOOD PRESSURE: 62 MMHG | HEIGHT: 64 IN

## 2023-10-12 DIAGNOSIS — Z01.818 PREOP GENERAL PHYSICAL EXAM: Primary | ICD-10-CM

## 2023-10-12 DIAGNOSIS — Z13.6 CARDIOVASCULAR SCREENING; LDL GOAL LESS THAN 130: ICD-10-CM

## 2023-10-12 DIAGNOSIS — M17.11 PRIMARY OSTEOARTHRITIS OF RIGHT KNEE: ICD-10-CM

## 2023-10-12 LAB
ALBUMIN UR-MCNC: NEGATIVE MG/DL
ANION GAP SERPL CALCULATED.3IONS-SCNC: 13 MMOL/L (ref 7–15)
APPEARANCE UR: CLEAR
BILIRUB UR QL STRIP: NEGATIVE
BUN SERPL-MCNC: 9.4 MG/DL (ref 8–23)
CALCIUM SERPL-MCNC: 9.5 MG/DL (ref 8.8–10.2)
CHLORIDE SERPL-SCNC: 104 MMOL/L (ref 98–107)
CHOLEST SERPL-MCNC: 210 MG/DL
COLOR UR AUTO: YELLOW
CREAT SERPL-MCNC: 0.79 MG/DL (ref 0.51–0.95)
DEPRECATED HCO3 PLAS-SCNC: 26 MMOL/L (ref 22–29)
EGFRCR SERPLBLD CKD-EPI 2021: 83 ML/MIN/1.73M2
ERYTHROCYTE [DISTWIDTH] IN BLOOD BY AUTOMATED COUNT: 12.6 % (ref 10–15)
GLUCOSE SERPL-MCNC: 91 MG/DL (ref 70–99)
GLUCOSE UR STRIP-MCNC: NEGATIVE MG/DL
HCT VFR BLD AUTO: 43 % (ref 35–47)
HDLC SERPL-MCNC: 56 MG/DL
HGB BLD-MCNC: 14.2 G/DL (ref 11.7–15.7)
HGB UR QL STRIP: NEGATIVE
KETONES UR STRIP-MCNC: NEGATIVE MG/DL
LDLC SERPL CALC-MCNC: 137 MG/DL
LEUKOCYTE ESTERASE UR QL STRIP: NEGATIVE
MCH RBC QN AUTO: 30.3 PG (ref 26.5–33)
MCHC RBC AUTO-ENTMCNC: 33 G/DL (ref 31.5–36.5)
MCV RBC AUTO: 92 FL (ref 78–100)
NITRATE UR QL: NEGATIVE
NONHDLC SERPL-MCNC: 154 MG/DL
PH UR STRIP: 5.5 [PH] (ref 5–7)
PLATELET # BLD AUTO: 231 10E3/UL (ref 150–450)
POTASSIUM SERPL-SCNC: 3.7 MMOL/L (ref 3.4–5.3)
RBC # BLD AUTO: 4.69 10E6/UL (ref 3.8–5.2)
SODIUM SERPL-SCNC: 143 MMOL/L (ref 135–145)
SP GR UR STRIP: 1.01 (ref 1–1.03)
TRIGL SERPL-MCNC: 84 MG/DL
UROBILINOGEN UR STRIP-ACNC: 0.2 E.U./DL
WBC # BLD AUTO: 5.9 10E3/UL (ref 4–11)

## 2023-10-12 PROCEDURE — 81003 URINALYSIS AUTO W/O SCOPE: CPT | Performed by: PHYSICIAN ASSISTANT

## 2023-10-12 PROCEDURE — 36415 COLL VENOUS BLD VENIPUNCTURE: CPT | Performed by: PHYSICIAN ASSISTANT

## 2023-10-12 PROCEDURE — 99214 OFFICE O/P EST MOD 30 MIN: CPT | Performed by: PHYSICIAN ASSISTANT

## 2023-10-12 PROCEDURE — 80061 LIPID PANEL: CPT | Performed by: PHYSICIAN ASSISTANT

## 2023-10-12 PROCEDURE — 80048 BASIC METABOLIC PNL TOTAL CA: CPT | Performed by: PHYSICIAN ASSISTANT

## 2023-10-12 PROCEDURE — 85027 COMPLETE CBC AUTOMATED: CPT | Performed by: PHYSICIAN ASSISTANT

## 2023-10-12 ASSESSMENT — PAIN SCALES - GENERAL: PAINLEVEL: NO PAIN (0)
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
## 2023-10-13 ENCOUNTER — TELEPHONE (OUTPATIENT)
Dept: FAMILY MEDICINE | Facility: OTHER | Age: 63
End: 2023-10-13
Payer: COMMERCIAL

## 2023-10-30 ENCOUNTER — TELEPHONE (OUTPATIENT)
Dept: FAMILY MEDICINE | Facility: OTHER | Age: 63
End: 2023-10-30
Payer: COMMERCIAL

## 2023-10-30 NOTE — TELEPHONE ENCOUNTER
Nithin BENAVIDES from [facility] requesting EKG to be faxed at [phone].  EKG faxed.

## 2023-12-11 ENCOUNTER — PATIENT OUTREACH (OUTPATIENT)
Dept: GERIATRIC MEDICINE | Facility: CLINIC | Age: 63
End: 2023-12-11
Payer: COMMERCIAL

## 2023-12-11 NOTE — LETTER
December 11, 2023    TYRELL LOONEY  [address]   [place] [address]    Dear  Tyrell,    Welcome to [facility] s MSC+ health program. My name is MERRITT Verma. I am your MSC+ care coordinator. You are eligible for Care Coordination through [facility] MSC+ plan.    As your care coordinator, we ll:  Meet to go over your care coordination benefits  Talk about your physical and mental health care needs   Review your preventative care needs  Create a plan that meets your needs with the services you choose    What happens next?  I ll call you soon to introduce myself and tell you more about my role. We ll then plan time to go over your health and safety needs. Our goal is to keep you as healthy and independent as possible.    Soon, you will receive a new MSC+ member identification (ID) card from [facility]. When you receive it, please use this card along with your [place] [facility] card and Prescription Drug Coverage Program card. When you receive, it please use this card where you get your health services. If you have Medicare, you will need to show your Medicare card when you get health services.    The [facility] care coordination program is voluntary and offered to you at no cost. If you wish to stop being in the care coordination program or have questions, call me at [phone]. If you reach my voicemail, leave a message and your phone number. TTY users, call the [place] [employer] at 929 or [phone] (jkpxdr-fj-qrqapl relay service).    Sincerely,        MERRITT Verma  [phone]  [email or web address]    [id number] accepted   [id number]       [id number] (07/2022)                               WDL

## 2023-12-11 NOTE — PROGRESS NOTES
[facility] Care Coordination Contact    Member became effective with  Ron on 12/1/23 with jm MSC+.  Previous Health Plan: [facility]  Previous Care System: [facility]  Previous care coordinators name and number: n/a  Waiver Type: N/A  Last MMIS Entry: Date No MMIS, and Type 00  MMIS visit date (and type) if different from above: n/a  Services Listed in MMIS: n/a  UTF received: No UTF to request  Address/Phone discrepancy: n/a    Alexandre Ho  Care Management Specialist  [facility]  [phone]

## 2023-12-19 ENCOUNTER — PATIENT OUTREACH (OUTPATIENT)
Dept: GERIATRIC MEDICINE | Facility: CLINIC | Age: 63
End: 2023-12-19
Payer: COMMERCIAL

## 2023-12-19 NOTE — PROGRESS NOTES
[facility] Care Coordination Contact    1st attempt - Called member to schedule annual HRA home visit. Left a message requesting a return call to schedule HRA.    MERRITT Verma  [facility]  [phone]

## 2023-12-21 ENCOUNTER — PATIENT OUTREACH (OUTPATIENT)
Dept: GERIATRIC MEDICINE | Facility: CLINIC | Age: 63
End: 2023-12-21
Payer: COMMERCIAL

## 2023-12-21 NOTE — PROGRESS NOTES
[facility] Care Coordination Contact    2nd attempt - Called member to schedule annual HRA home visit. Left a message requesting a return call to schedule HRA.    MERRITT Verma  [facility]  [phone]

## 2023-12-22 ENCOUNTER — PATIENT OUTREACH (OUTPATIENT)
Dept: GERIATRIC MEDICINE | Facility: CLINIC | Age: 63
End: 2023-12-22
Payer: COMMERCIAL

## 2023-12-22 NOTE — PROGRESS NOTES
[facility] Care Coordination Contact    3rd attempt -Called member to schedule annual HRA home visit. Left a message requesting a return call to schedule HRA.    CC will task CMS to send Unable to contact letter to member.    MERRITT Verma  [facility]  [phone]

## 2023-12-22 NOTE — LETTER
January 4, 2024    TYRELL LOONEY  [address]   [place] [address]    Dear Tyrell:     I m your care coordinator. I ve been unable to reach you by phone. I am writing to ask you or your authorized representative to call me at [phone]. If you reach my voicemail, leave a message with your daytime phone number. Include a date and time that I can call you. If you are hearing impaired, call the [place] [employer]ay at 666 or [phone] (lpfeqq-ir-uyvmyk relay service).    The reason I am trying to reach you is:     [] To schedule an assessment  [] For your six (6)-month check-in  [x] Other: Initial Assessment     Please call me as soon as you receive this letter. I look forward to speaking with you.    Sincerely,      MERRITT Verma  [phone]  [email or web address]        [id number] accepted  [id number]                                                                        B  (08/2022)

## 2023-12-22 NOTE — LETTER
January 4, 2024    TYRELL LOONEY  [address]   [place] [address]    Dear Tyrell:     I m your care coordinator. I ve been unable to reach you by phone. I am writing to ask you or your authorized representative to call me at [phone]. If you reach my voicemail, leave a message with your daytime phone number. Include a date and time that I can call you. If you are hearing impaired, call the [place] [facility] at 366 or [phone] (bndhhi-ep-jsysbw relay service).    The reason I am trying to reach you is:     [] To schedule an assessment  [] For your six (6)-month check-in  [] Other: Initial assessment     Please call me as soon as you receive this letter. I look forward to speaking with you.    Sincerely,      MERRITT Verma  [phone]  [email or web address]        [id number] accepted  [id number]                                                                        B  (08/2022)

## 2023-12-22 NOTE — LETTER
January 4, 2024    TYRELL LOONEY  [address]   [place] [address]    Dear Tyrell:     I m your care coordinator. I ve been unable to reach you by phone. I am writing to ask you or your authorized representative to call me at [phone]. If you reach my voicemail, leave a message with your daytime phone number. Include a date and time that I can call you. If you are hearing impaired, call the [place] [facility] at 372 or [phone] (owirim-gr-xxgzrc relay service).    The reason I am trying to reach you is:     [] To schedule an assessment  [] For your six (6)-month check-in  [x] Other: Annual Assessment     Please call me as soon as you receive this letter. I look forward to speaking with you.    Sincerely,      MERRITT Verma  [phone]  [email or web address]        [id number] accepted  [id number]                                                                        B  (08/2022)

## 2023-12-22 NOTE — PROGRESS NOTES
[facility] Care Coordination Contact    Completed 4 attempts to reach client with no response.  Member is officially unable to contact effective today.  Completed MMIS entry.  Completed health plan required [facility] POC.    Follow-up Plan: CC will attempt to reach member in six months.    This CC note routed to PCP, Allan Peña.    MERRITT Verma  [facility]  [phone]

## 2024-01-04 NOTE — PROGRESS NOTES
"[facility] Care Coordination Contact    Per CC, mailed client an \"Unable to Contact\" letter.    Alexandre Ho  Care Management Specialist  [facility]  [phone]    "

## 2024-03-29 ENCOUNTER — OFFICE VISIT (OUTPATIENT)
Dept: URGENT CARE | Facility: URGENT CARE | Age: 64
End: 2024-03-29
Payer: COMMERCIAL

## 2024-03-29 VITALS
HEART RATE: 78 BPM | OXYGEN SATURATION: 98 % | TEMPERATURE: 97.6 F | SYSTOLIC BLOOD PRESSURE: 120 MMHG | BODY MASS INDEX: 23.39 KG/M2 | WEIGHT: 135 LBS | DIASTOLIC BLOOD PRESSURE: 78 MMHG | RESPIRATION RATE: 18 BRPM

## 2024-03-29 DIAGNOSIS — L30.9 ECZEMA, UNSPECIFIED TYPE: ICD-10-CM

## 2024-03-29 DIAGNOSIS — R30.0 DYSURIA: Primary | ICD-10-CM

## 2024-03-29 DIAGNOSIS — N39.0 ACUTE UTI (URINARY TRACT INFECTION): ICD-10-CM

## 2024-03-29 LAB
ALBUMIN UR-MCNC: NEGATIVE MG/DL
APPEARANCE UR: ABNORMAL
BACTERIA #/AREA URNS HPF: ABNORMAL /HPF
BILIRUB UR QL STRIP: NEGATIVE
COLOR UR AUTO: YELLOW
GLUCOSE UR STRIP-MCNC: NEGATIVE MG/DL
HGB UR QL STRIP: ABNORMAL
KETONES UR STRIP-MCNC: NEGATIVE MG/DL
LEUKOCYTE ESTERASE UR QL STRIP: ABNORMAL
NITRATE UR QL: NEGATIVE
PH UR STRIP: 5.5 [PH] (ref 5–7)
RBC #/AREA URNS AUTO: ABNORMAL /HPF
SP GR UR STRIP: <=1.005 (ref 1–1.03)
UROBILINOGEN UR STRIP-ACNC: 0.2 E.U./DL
WBC #/AREA URNS AUTO: ABNORMAL /HPF

## 2024-03-29 PROCEDURE — 81001 URINALYSIS AUTO W/SCOPE: CPT | Performed by: FAMILY MEDICINE

## 2024-03-29 PROCEDURE — 99213 OFFICE O/P EST LOW 20 MIN: CPT | Performed by: FAMILY MEDICINE

## 2024-03-29 PROCEDURE — 87086 URINE CULTURE/COLONY COUNT: CPT | Performed by: FAMILY MEDICINE

## 2024-03-29 RX ORDER — NITROFURANTOIN 25; 75 MG/1; MG/1
100 CAPSULE ORAL 2 TIMES DAILY
Qty: 10 CAPSULE | Refills: 0 | Status: SHIPPED | OUTPATIENT
Start: 2024-03-29 | End: 2024-04-03

## 2024-03-29 RX ORDER — TRIAMCINOLONE ACETONIDE 5 MG/G
OINTMENT TOPICAL
Qty: 15 G | Refills: 1 | Status: SHIPPED | OUTPATIENT
Start: 2024-03-29

## 2024-03-29 NOTE — PROGRESS NOTES
CC:   Chief Complaint   Patient presents with    Urgent Care    UTI     Per patient states symptoms have been ongoing for a couple of days has been having burning sensation when urinating      There are no exam notes on file for this visit.      symptoms have been ongoing for a couple of days has been having burning sensation when urinating     also has excema on hands, exacerbated by washing doishes        Current Outpatient Medications   Medication    nitroFURantoin macrocrystal-monohydrate (MACROBID) 100 MG capsule    triamcinolone (KENALOG) 0.5 % external ointment     No current facility-administered medications for this visit.     I have reviewed the patient's medical history in detail; there are no changes to the history as noted in EpicCare.    ROS: As per HPI.  Constitutional: no fevers/sweats/chills   GI: no nausea/vomiting       EXAM  /78   Pulse 78   Temp 97.6  F (36.4  C) (Tympanic)   Resp 18   Wt 61.2 kg (135 lb)   LMP 05/12/2011 (Approximate)   SpO2 98%   BMI 23.39 kg/m    Gen: Healthy appearing female in no apparent distress    Results for orders placed or performed in visit on 03/29/24 (from the past 24 hour(s))   UA Macroscopic with reflex to Microscopic and Culture    Specimen: Urine, Clean Catch   Result Value Ref Range    Color Urine Yellow Colorless, Straw, Light Yellow, Yellow    Appearance Urine Slightly Cloudy (A) Clear    Glucose Urine Negative Negative mg/dL    Bilirubin Urine Negative Negative    Ketones Urine Negative Negative mg/dL    Specific Gravity Urine <=1.005 1.003 - 1.035    Blood Urine Trace (A) Negative    pH Urine 5.5 5.0 - 7.0    Protein Albumin Urine Negative Negative mg/dL    Urobilinogen Urine 0.2 0.2, 1.0 E.U./dL    Nitrite Urine Negative Negative    Leukocyte Esterase Urine Moderate (A) Negative   Urine Microscopic Exam   Result Value Ref Range    Bacteria Urine Few (A) None Seen /HPF    RBC Urine 0-2 0-2 /HPF /HPF    WBC Urine 25-50 (A) 0-5 /HPF /HPF        ASSESSMENT/PLAN:      ICD-10-CM    1. Dysuria  R30.0 UA Macroscopic with reflex to Microscopic and Culture     UA Macroscopic with reflex to Microscopic and Culture     Urine Microscopic Exam     Urine Culture      2. Acute UTI (urinary tract infection)  N39.0 nitroFURantoin macrocrystal-monohydrate (MACROBID) 100 MG capsule      3. Eczema, unspecified type  L30.9 triamcinolone (KENALOG) 0.5 % external ointment        Differential diagnosis includes typical urinary tract infection versus other sources of pelvic pain or urethritis  empiric antibioic treatment for the most common pathogens  Discussed urine culture follow-up by phone  Follow up: Return as needed if symptoms fail to improve      Khanh Gonsalez MD MPH

## 2024-03-29 NOTE — PATIENT INSTRUCTIONS
Urinary Tract Infection (UTI) in Women: Care Instructions  Overview     A urinary tract infection (UTI) is an infection caused by bacteria. It can happen anywhere in the urinary tract. A UTI can happen in the:  Kidneys.  Ureters, the tubes that connect the kidneys to the bladder.  Bladder.  Urethra, where the urine comes out.  Most UTIs are bladder infections. They often cause pain or burning when you urinate.  Most UTIs can be cured with antibiotics. If you are prescribed antibiotics, be sure to complete your treatment so that the infection does not get worse.  Follow-up care is a key part of your treatment and safety. Be sure to make and go to all appointments, and call your doctor if you are having problems. It's also a good idea to know your test results and keep a list of the medicines you take.  How can you care for yourself at home?  Take your antibiotics as directed. Do not stop taking them just because you feel better. You need to take the full course of antibiotics.  Drink extra water and other fluids for the next day or two. This will help make the urine less concentrated and help wash out the bacteria that are causing the infection. (If you have kidney, heart, or liver disease and have to limit fluids, talk with your doctor before you increase the amount of fluids you drink.)  Avoid drinks that are carbonated or have caffeine. They can irritate the bladder.  Urinate often. Try to empty your bladder each time.  To relieve pain, take a hot bath or lay a heating pad set on low over your lower belly or genital area. Never go to sleep with a heating pad in place.  To prevent UTIs  Drink plenty of water each day. This helps you urinate often, which clears bacteria from your system. (If you have kidney, heart, or liver disease and have to limit fluids, talk with your doctor before you increase the amount of fluids you drink.)  Urinate when you need to.  If you are sexually active, urinate right after you have  "sex.  Change sanitary pads often.  Avoid douches, bubble baths, feminine hygiene sprays, and other feminine hygiene products that have deodorants.  After going to the bathroom, wipe from front to back.  When should you call for help?   Call your doctor now or seek immediate medical care if:    You have new or worse fever, chills, nausea, or vomiting.     You have new pain in your back just below your rib cage. This is called flank pain.     There is new blood or pus in your urine.     You have any problems with your antibiotic medicine.   Watch closely for changes in your health, and be sure to contact your doctor if:    You are not getting better after taking an antibiotic for 2 days.     Your symptoms go away but then come back.   Where can you learn more?  Go to https://www.[employer].net/patiented  Enter K848 in the search box to learn more about \"Urinary Tract Infection (UTI) in Women: Care Instructions.\"  Current as of: November 15, 2023               Content Version: 14.0    1122-4125 [employer].   Care instructions adapted under license by your healthcare professional. If you have questions about a medical condition or this instruction, always ask your healthcare professional. [employer] disclaims any warranty or liability for your use of this information.      "

## 2024-03-31 LAB — BACTERIA UR CULT: NORMAL

## 2024-05-08 ENCOUNTER — PATIENT OUTREACH (OUTPATIENT)
Dept: GERIATRIC MEDICINE | Facility: CLINIC | Age: 64
End: 2024-05-08
Payer: COMMERCIAL

## 2024-05-08 NOTE — PROGRESS NOTES
[facility] Care Coordination Contact    No longer active with [facility] community case management effective 4/30/24.  Reason for community disenrollment: MERRITT De Leon  [facility]  [phone]

## 2024-09-16 ENCOUNTER — TRANSFERRED RECORDS (OUTPATIENT)
Dept: HEALTH INFORMATION MANAGEMENT | Facility: CLINIC | Age: 64
End: 2024-09-16
Payer: COMMERCIAL

## 2024-10-29 ENCOUNTER — TELEPHONE (OUTPATIENT)
Dept: FAMILY MEDICINE | Facility: OTHER | Age: 64
End: 2024-10-29
Payer: COMMERCIAL

## 2024-10-29 NOTE — TELEPHONE ENCOUNTER
Patient Quality Outreach    Patient is due for the following:   Breast Cancer Screening - Mammogram  Physical Annual Wellness Visit    Next Steps:   Schedule a Annual Wellness Visit    Type of outreach:    Sent letter.      Questions for provider review:    None           Margareth Hatfield, CMA

## 2024-10-29 NOTE — LETTER
October 29, 2024      July Green  [address]   [facility] [address]        Dear July,         Our records indicate that you are due for a Annual Wellness Check with your Primary Care Provider. You are also overdue for your mammogram. Please call to schedule.     Our records also indicate that you are due for some immunizations, labs and/or preventative screenings.     Please schedule an appointment via [employer] or by calling the clinic at [phone], and asking to speak to a member of your care team.        To Schedule an Appointment via [employer] -     1) Click the Visits tab (located on the top left) and select the option to Schedule an Appointment  2) Choose the department you would like to schedule in and respond to any additional questions that populate.  (Note: There are also options for you to indicate preferred clinic locations and providers)  3) Verify your Personal Information by clicking This Information Is Correct or Edit if the information is not correct  4) Review the insurance information on file and if current, select This Information Is Correct   If the information is not correct, you can select options to remove or update your coverage.  5) Where prompted, please specify the reason for your appointment and select Schedule    After the appointment has been scheduled, you will see a confirmation with all the details for your upcoming visit.      Take Care,  Your [place] Patient Care Team    [facility]  Phone: [phone]  Fax: [phone]

## 2024-12-11 ENCOUNTER — OFFICE VISIT (OUTPATIENT)
Dept: FAMILY MEDICINE | Facility: OTHER | Age: 64
End: 2024-12-11
Payer: COMMERCIAL

## 2024-12-11 VITALS
SYSTOLIC BLOOD PRESSURE: 110 MMHG | HEIGHT: 63 IN | BODY MASS INDEX: 26.49 KG/M2 | OXYGEN SATURATION: 95 % | TEMPERATURE: 97.7 F | DIASTOLIC BLOOD PRESSURE: 70 MMHG | HEART RATE: 75 BPM | RESPIRATION RATE: 20 BRPM | WEIGHT: 149.5 LBS

## 2024-12-11 DIAGNOSIS — Z13.1 SCREENING FOR DIABETES MELLITUS: ICD-10-CM

## 2024-12-11 DIAGNOSIS — E78.5 HYPERLIPIDEMIA LDL GOAL <130: ICD-10-CM

## 2024-12-11 DIAGNOSIS — Z78.0 ASYMPTOMATIC POSTMENOPAUSAL STATUS: ICD-10-CM

## 2024-12-11 DIAGNOSIS — H91.93 BILATERAL HEARING LOSS, UNSPECIFIED HEARING LOSS TYPE: ICD-10-CM

## 2024-12-11 DIAGNOSIS — R41.3 MEMORY IMPAIRMENT: ICD-10-CM

## 2024-12-11 DIAGNOSIS — Z00.00 WELCOME TO MEDICARE PREVENTIVE VISIT: Primary | ICD-10-CM

## 2024-12-11 DIAGNOSIS — Z12.31 VISIT FOR SCREENING MAMMOGRAM: ICD-10-CM

## 2024-12-11 DIAGNOSIS — H69.91 DYSFUNCTION OF RIGHT EUSTACHIAN TUBE: ICD-10-CM

## 2024-12-11 LAB
ANION GAP SERPL CALCULATED.3IONS-SCNC: 11 MMOL/L (ref 7–15)
BUN SERPL-MCNC: 8.2 MG/DL (ref 8–23)
CALCIUM SERPL-MCNC: 9.4 MG/DL (ref 8.8–10.4)
CHLORIDE SERPL-SCNC: 103 MMOL/L (ref 98–107)
CHOLEST SERPL-MCNC: 202 MG/DL
CREAT SERPL-MCNC: 0.78 MG/DL (ref 0.51–0.95)
EGFRCR SERPLBLD CKD-EPI 2021: 83 ML/MIN/1.73M2
FASTING STATUS PATIENT QL REPORTED: NO
FASTING STATUS PATIENT QL REPORTED: NO
GLUCOSE SERPL-MCNC: 89 MG/DL (ref 70–99)
HCO3 SERPL-SCNC: 27 MMOL/L (ref 22–29)
HDLC SERPL-MCNC: 63 MG/DL
LDLC SERPL CALC-MCNC: 121 MG/DL
NONHDLC SERPL-MCNC: 139 MG/DL
POTASSIUM SERPL-SCNC: 4.2 MMOL/L (ref 3.4–5.3)
SODIUM SERPL-SCNC: 141 MMOL/L (ref 135–145)
TRIGL SERPL-MCNC: 88 MG/DL

## 2024-12-11 PROCEDURE — 36415 COLL VENOUS BLD VENIPUNCTURE: CPT | Performed by: PHYSICIAN ASSISTANT

## 2024-12-11 PROCEDURE — 80048 BASIC METABOLIC PNL TOTAL CA: CPT | Performed by: PHYSICIAN ASSISTANT

## 2024-12-11 PROCEDURE — 80061 LIPID PANEL: CPT | Performed by: PHYSICIAN ASSISTANT

## 2024-12-11 RX ORDER — TRIAMCINOLONE ACETONIDE 5 MG/G
OINTMENT TOPICAL
Qty: 15 G | Refills: 1 | Status: CANCELLED | OUTPATIENT
Start: 2024-12-11

## 2024-12-11 SDOH — HEALTH STABILITY: PHYSICAL HEALTH: ON AVERAGE, HOW MANY DAYS PER WEEK DO YOU ENGAGE IN MODERATE TO STRENUOUS EXERCISE (LIKE A BRISK WALK)?: 5 DAYS

## 2024-12-11 ASSESSMENT — PAIN SCALES - GENERAL: PAINLEVEL_OUTOF10: NO PAIN (0)

## 2024-12-11 ASSESSMENT — SOCIAL DETERMINANTS OF HEALTH (SDOH): HOW OFTEN DO YOU GET TOGETHER WITH FRIENDS OR RELATIVES?: TWICE A WEEK

## 2024-12-11 NOTE — PATIENT INSTRUCTIONS
Patient Education     I recommend trying Flonase and Claritin for a few weeks to help with the ear fluid.  Will refer to ENT and audiology for hearing testing.    Keep mind and body active for your memory.    Will order mammogram and DEXA scan (osteoporosis screening).    Follow-up annually.  Preventive Care Advice   This is general advice given by our system to help you stay healthy. However, your care team may have specific advice just for you. Please talk to your care team about your preventive care needs.  Nutrition  Eat 5 or more servings of fruits and vegetables each day.  Try wheat bread, brown rice and whole grain pasta (instead of white bread, rice, and pasta).  Get enough calcium and vitamin D. Check the label on foods and aim for 100% of the RDA (recommended daily allowance).  Lifestyle  Exercise at least 150 minutes each week  (30 minutes a day, 5 days a week).  Do muscle strengthening activities 2 days a week. These help control your weight and prevent disease.  No smoking.  Wear sunscreen to prevent skin cancer.  Have a dental exam and cleaning every 6 months.  Yearly exams  See your health care team every year to talk about:  Any changes in your health.  Any medicines your care team has prescribed.  Preventive care, family planning, and ways to prevent chronic diseases.  Shots (vaccines)   HPV shots (up to age 26), if you've never had them before.  Hepatitis B shots (up to age 59), if you've never had them before.  COVID-19 shot: Get this shot when it's due.  Flu shot: Get a flu shot every year.  Tetanus shot: Get a tetanus shot every 10 years.  Pneumococcal, hepatitis A, and RSV shots: Ask your care team if you need these based on your risk.  Shingles shot (for age 50 and up)  General health tests  Diabetes screening:  Starting at age 35, Get screened for diabetes at least every 3 years.  If you are younger than age 35, ask your care team if you should be screened for diabetes.  Cholesterol test: At  age 39, start having a cholesterol test every 5 years, or more often if advised.  Bone density scan (DEXA): At age 50, ask your care team if you should have this scan for osteoporosis (brittle bones).  Hepatitis C: Get tested at least once in your life.  STIs (sexually transmitted infections)  Before age 24: Ask your care team if you should be screened for STIs.  After age 24: Get screened for STIs if you're at risk. You are at risk for STIs (including HIV) if:  You are sexually active with more than one person.  You don't use condoms every time.  You or a partner was diagnosed with a sexually transmitted infection.  If you are at risk for HIV, ask about PrEP medicine to prevent HIV.  Get tested for HIV at least once in your life, whether you are at risk for HIV or not.  Cancer screening tests  Cervical cancer screening: If you have a cervix, begin getting regular cervical cancer screening tests starting at age 21.  Breast cancer scan (mammogram): If you've ever had breasts, begin having regular mammograms starting at age 40. This is a scan to check for breast cancer.  Colon cancer screening: It is important to start screening for colon cancer at age 45.  Have a colonoscopy test every 10 years (or more often if you're at risk) Or, ask your provider about stool tests like a FIT test every year or Cologuard test every 3 years.  To learn more about your testing options, visit:   .  For help making a decision, visit:   https://bit.ly/mv43645.  Prostate cancer screening test: If you have a prostate, ask your care team if a prostate cancer screening test (PSA) at age 55 is right for you.  Lung cancer screening: If you are a current or former smoker ages 50 to 80, ask your care team if ongoing lung cancer screenings are right for you.  For informational purposes only. Not to replace the advice of your health care provider. Copyright   2023 [employer]. All rights reserved. Clinically reviewed by the [facility]. [employer] 798412 - REV 01/24.  Learning About Activities of Daily Living  What are activities of daily living?     Activities of daily living (ADLs) are the basic self-care tasks you do every day. These include eating, bathing, dressing, and moving around.  As you age, and if you have health problems, you may find that it's harder to do some of these tasks. If so, your doctor can suggest ideas that may help.  To measure what kind of help you may need, your doctor will ask how well you are able to do ADLs. Let your doctor know if there are any tasks that you are having trouble doing. This is an important first step to getting help. And when you have the help you need, you can stay as independent as possible.  How will a doctor assess your ADLs?  Asking about ADLs is part of a routine health checkup your doctor will likely do as you age. Your health check might be done in a doctor's office, in your home, or at a hospital. The goal is to find out if you are having any problems that could make it hard to care for yourself or that make it unsafe for you to be on your own.  To measure your ADLs, your doctor will ask how hard it is for you to do routine tasks. Your doctor may also want to know if you have changed the way you do a task because of a health problem. Your doctor may watch how you:  Walk back and forth.  Keep your balance while you stand or walk.  Move from sitting to standing or from a bed to a chair.  Button or unbutton a shirt or sweater.  Remove and put on your shoes.  It's common to feel a little worried or anxious if you find you can't do all the things you used to be able to do. Talking with your doctor about ADLs is a way to make sure you're as safe as possible and able to care for yourself as well as you can. You may want to bring a caregiver, friend, or family member to your checkup. They can help you talk to your doctor.  Follow-up care is a key part of your treatment  and safety. Be sure to make and go to all appointments, and call your doctor if you are having problems. It's also a good idea to know your test results and keep a list of the medicines you take.  Current as of: October 24, 2023  Content Version: 14.2    2024 [facility].   Care instructions adapted under license by your healthcare professional. If you have questions about a medical condition or this instruction, always ask your healthcare professional. [employer] disclaims any warranty or liability for your use of this information.    Preventing Falls: Care Instructions  Injuries and health problems such as trouble walking or poor eyesight can increase your risk of falling. So can some medicines. But there are things you can do to help prevent falls. You can exercise to get stronger. You can also arrange your home to make it safer.    Talk to your doctor about the medicines you take. Ask if any of them increase the risk of falls and whether they can be changed or stopped.   Try to exercise regularly. It can help improve your strength and balance. This can help lower your risk of falling.         Practice fall safety and prevention.   Wear low-heeled shoes that fit well and give your feet good support. Talk to your doctor if you have foot problems that make this hard.  Carry a cellphone or wear a medical alert device that you can use to call for help.  Use stepladders instead of chairs to reach high objects. Don't climb if you're at risk for falls. Ask for help, if needed.  Wear the correct eyeglasses, if you need them.        Make your home safer.   Remove rugs, cords, clutter, and furniture from walkways.  Keep your house well lit. Use night-lights in hallways and bathrooms.  Install and use sturdy handrails on stairways.  Wear nonskid footwear, even inside. Don't walk barefoot or in socks without shoes.        Be safe outside.   Use handrails, curb cuts, and ramps whenever possible.  Keep  "your hands free by using a shoulder bag or backpack.  Try to walk in well-lit areas. Watch out for uneven ground, changes in pavement, and debris.  Be careful in the winter. Walk on the grass or gravel when sidewalks are slippery. Use de-icer on steps and walkways. Add non-slip devices to shoes.    Put grab bars and nonskid mats in your shower or tub and near the toilet. Try to use a shower chair or bath bench when bathing.   Get into a tub or shower by putting in your weaker leg first. Get out with your strong side first. Have a phone or medical alert device in the bathroom with you.   Where can you learn more?  Go to https://www.[employer].net/patiented  Enter G117 in the search box to learn more about \"Preventing Falls: Care Instructions.\"  Current as of: July 17, 2023  Content Version: 14.2 2024 [email or web address].   Care instructions adapted under license by your healthcare professional. If you have questions about a medical condition or this instruction, always ask your healthcare professional. [employer] disclaims any warranty or liability for your use of this information.    Hearing Loss: Care Instructions  Overview     Hearing loss is a sudden or slow decrease in how well you hear. It can range from slight to profound. Permanent hearing loss can occur with aging. It also can happen when you are exposed long-term to loud noise. Examples include listening to loud music, riding motorcycles, or being around other loud machines.  Hearing loss can affect your work and home life. It can make you feel lonely or depressed. You may feel that you have lost your independence. But hearing aids and other devices can help you hear better and feel connected to others.  Follow-up care is a key part of your treatment and safety. Be sure to make and go to all appointments, and call your doctor if you are having problems. It's also a good idea to know your test results and keep a list of the medicines you " take.  How can you care for yourself at home?  Avoid loud noises whenever possible. This helps keep your hearing from getting worse.  Always wear hearing protection around loud noises.  Wear a hearing aid as directed.  A professional can help you pick a hearing aid that will work best for you.  You can also get hearing aids over the counter for mild to moderate hearing loss.  Have hearing tests as your doctor suggests. They can show whether your hearing has changed. Your hearing aid may need to be adjusted.  Use other devices as needed. These may include:  Telephone amplifiers and hearing aids that can connect to a television, stereo, radio, or microphone.  Devices that use lights or vibrations. These alert you to the doorbell, a ringing telephone, or a baby monitor.  Television closed-captioning. This shows the words at the bottom of the screen. Most new TVs can do this.  TTY (text telephone). This lets you type messages back and forth on the telephone instead of talking or listening. These devices are also called TDD. When messages are typed on the keyboard, they are sent over the phone line to a receiving TTY. The message is shown on a monitor.  Use text messaging, social media, and email if it is hard for you to communicate by telephone.  Try to learn a listening technique called speechreading. It is not lipreading. You pay attention to people's gestures, expressions, posture, and tone of voice. These clues can help you understand what a person is saying. Face the person you are talking to, and have them face you. Make sure the lighting is good. You need to see the other person's face clearly.  Think about counseling if you need help to adjust to your hearing loss.  When should you call for help?  Watch closely for changes in your health, and be sure to contact your doctor if:    You think your hearing is getting worse.     You have new symptoms, such as dizziness or nausea.   Where can you learn more?  Go to  "https://www.[employer].net/patiented  Enter R798 in the search box to learn more about \"Hearing Loss: Care Instructions.\"  Current as of: September 27, 2023  Content Version: 14.2 2024 [employer].   Care instructions adapted under license by your healthcare professional. If you have questions about a medical condition or this instruction, always ask your healthcare professional. Healthwise[employer] Incorporated disclaims any warranty or liability for your use of this information.    Bladder Training: Care Instructions  Your Care Instructions     Bladder training is used to treat urge incontinence and stress incontinence. Urge incontinence means that the need to urinate comes on so fast that you can't get to a toilet in time. Stress incontinence means that you leak urine because of pressure on your bladder. For example, it may happen when you laugh, cough, or lift something heavy.  Bladder training can increase how long you can wait before you have to urinate. It can also help your bladder hold more urine. And it can give you better control over the urge to urinate.  It is important to remember that bladder training takes a few weeks to a few months to make a difference. You may not see results right away, but don't give up.  Follow-up care is a key part of your treatment and safety. Be sure to make and go to all appointments, and call your doctor if you are having problems. It's also a good idea to know your test results and keep a list of the medicines you take.  How can you care for yourself at home?  Work with your doctor to come up with a bladder training program that is right for you. You may use one or more of the following methods.  Delayed urination  In the beginning, try to keep from urinating for 5 minutes after you first feel the need to go.  While you wait, take deep, slow breaths to relax. Kegel exercises can also help you delay the need to go to the bathroom.  After some practice, when you can " "easily wait 5 minutes to urinate, try to wait 10 minutes before you urinate.  Slowly increase the waiting period until you are able to control when you have to urinate.  Scheduled urination  Empty your bladder when you first wake up in the morning.  Schedule times throughout the day when you will urinate.  Start by going to the bathroom every hour, even if you don't need to go.  Slowly increase the time between trips to the bathroom.  When you have found a schedule that works well for you, keep doing it.  If you wake up during the night and have to urinate, do it. Apply your schedule to waking hours only.  Kegel exercises  These tighten and strengthen pelvic muscles, which can help you control the flow of urine. (If doing these exercises causes pain, stop doing them and talk with your doctor.) To do Kegel exercises:  Squeeze your muscles as if you were trying not to pass gas. Or squeeze your muscles as if you were stopping the flow of urine. Your belly, legs, and buttocks shouldn't move.  Hold the squeeze for 3 seconds, then relax for 5 to 10 seconds.  Start with 3 seconds, then add 1 second each week until you are able to squeeze for 10 seconds.  Repeat the exercise 10 times a session. Do 3 to 8 sessions a day.  When should you call for help?  Watch closely for changes in your health, and be sure to contact your doctor if:    Your incontinence is getting worse.     You do not get better as expected.   Where can you learn more?  Go to https://www.[employer].net/patiented  Enter V684 in the search box to learn more about \"Bladder Training: Care Instructions.\"  Current as of: November 15, 2023  Content Version: 14.2 2024 [facility] [employer].   Care instructions adapted under license by your healthcare professional. If you have questions about a medical condition or this instruction, always ask your healthcare professional. [employer] Incorporated disclaims any warranty or liability for your use of this " information.

## 2024-12-11 NOTE — PROGRESS NOTES
"Preventive Care Visit  [facility]  Allan Peña PA-C, Family Medicine  Dec 11, 2024    Assessment & Plan       ICD-10-CM    1. Welcome to Medicare preventive visit  Z00.00       2. Asymptomatic postmenopausal status  Z78.0 DEXA HIP/PELVIS/SPINE - Future      3. Bilateral hearing loss, unspecified hearing loss type  H91.93 Adult ENT  Referral     Adult Audiology  Referral      4. Dysfunction of right eustachian tube  H69.91       5. Hyperlipidemia LDL goal <130  E78.5 Lipid panel reflex to direct LDL Fasting     Lipid panel reflex to direct LDL Fasting      6. Memory impairment  R41.3       7. Visit for screening mammogram  Z12.31 MA Screen Bilateral w/Allen      8. Screening for diabetes mellitus  Z13.1 Basic metabolic panel  (Ca, Cl, CO2, Creat, Gluc, K, Na, BUN)     Basic metabolic panel  (Ca, Cl, CO2, Creat, Gluc, K, Na, BUN)          1. Welcome to Medicare wellness visit completed.    2. DEXA scan ordered for screening.    3-4. Bilateral hearing loss and right TM effusion. Will refer to ENT/audiology for hearing evaluation and I recommend Flonase and Claritin for 2-3 weeks for the ear effusion/eustachian tube dysfunction.    5. Updated fasting lipid panel ordered.    6. I recommend she keep her mind and body active with physical exercise, games, puzzles, reading, etc. No concerns for early dementia at this time.    7. Screening mammogram ordered.    8. Updated BMP ordered.    Follow-up annually.    Patient has been advised of split billing requirements and indicates understanding: Yes        BMI  Estimated body mass index is 26.16 kg/m  as calculated from the following:    Height as of this encounter: 1.61 m (5' 3.39\").    Weight as of this encounter: 67.8 kg (149 lb 8 oz).   Weight management plan: Discussed healthy diet and exercise guidelines    Counseling  Appropriate preventive services were addressed with this patient via screening, questionnaire, or discussion " as appropriate for fall prevention, nutrition, physical activity, Tobacco-use cessation, social engagement, weight loss and cognition.  Checklist reviewing preventive services available has been given to the patient.  Reviewed patient's diet, addressing concerns and/or questions.   The patient was instructed to see the dentist every 6 months.   Patient reported safety concerns were addressed today.The patient was provided with written information regarding signs of hearing loss.   Information on urinary incontinence and treatment options given to patient.       Jhony Mcdowell is a 64 year old, presenting for the following:  Physical        12/11/2024     1:53 PM   Additional Questions   Roomed by Margareth MARIO     Patient is 20/20 with both eyes with corrective lenses    HPI    She states her memory is not great. She sometimes forgets why she enters a room or directions. It is not severe but has noticed it more.    She has noticed a full feeling in her right ear for the past 1-2 months. She also has bilateral decreased hearing and increased postnasal drainage.       Health Care Directive  Patient does not have a Health Care Directive: Discussed advance care planning with patient; information given to patient to review.      12/11/2024   General Health   How would you rate your overall physical health? Good   Feel stress (tense, anxious, or unable to sleep) Only a little      (!) STRESS CONCERN      12/11/2024   Nutrition   Diet: Regular (no restrictions)            12/11/2024   Exercise   Days per week of moderate/strenous exercise 5 days            12/11/2024   Social Factors   Frequency of gathering with friends or relatives Twice a week   Worry food won't last until get money to buy more No   Food not last or not have enough money for food? No   Do you have housing? (Housing is defined as stable permanent housing and does not include staying ouside in a car, in a tent, in an abandoned building, in an overnight  shelter, or couch-surfing.) Yes   Are you worried about losing your housing? No   Lack of transportation? No   Unable to get utilities (heat,electricity)? No            12/11/2024   Fall Risk   Fallen 2 or more times in the past year? No     No    Trouble with walking or balance? Yes     Yes    Gait Speed Test (Document in seconds) 4.5   Gait Speed Test Interpretation Less than or equal to 5.00 seconds - PASS       Patient-reported    Multiple values from one day are sorted in reverse-chronological order          12/11/2024   Activities of Daily Living- Home Safety   Needs help with the following daily activites None of the above   Safety concerns in the home No grab bars in the bathroom    Poor lighting       Multiple values from one day are sorted in reverse-chronological order         12/11/2024   Dental   Dentist two times every year? (!) NO            12/11/2024   Hearing Screening   Hearing concerns? (!) I FEEL THAT PEOPLE ARE MUMBLING OR NOT SPEAKING CLEARLY.    (!) I NEED TO ASK PEOPLE TO SPEAK UP OR REPEAT THEMSELVES.    (!) IT'S HARD TO FOLLOW A CONVERSATION IN A NOISY RESTAURANT OR CROWDED ROOM.    (!) TROUBLE UNDESTANDING A SPEAKER IN A PUBLIC MEETING OR [employer] SERVICE.    (!) TROUBLE FOLLOWING DIALOGUE IN THE THEATHER.    (!) TROUBLE UNDERSTANDING SOFT OR WHISPERED SPEECH.       Multiple values from one day are sorted in reverse-chronological order         12/11/2024   Driving Risk Screening   Patient/family members have concerns about driving No            12/11/2024   General Alertness/Fatigue Screening   Have you been more tired than usual lately? No            12/11/2024   Urinary Incontinence Screening   Bothered by leaking urine in past 6 months Yes            12/11/2024   TB Screening   Were you born outside of the [place]? No        Today's PHQ-2 Score:       12/11/2024     1:53 PM   PHQ-2 ( 1999 [employer])   Q1: Little interest or pleasure in doing things 0    Q2: Feeling down, depressed or hopeless  0    PHQ-2 Score 0    Q1: Little interest or pleasure in doing things Not at all   Q2: Feeling down, depressed or hopeless Not at all   PHQ-2 Score 0       Patient-reported           12/11/2024   Substance Use   Alcohol more than 3/day or more than 7/wk No   Do you have a current opioid prescription? No   How severe/bad is pain from 1 to 10? 0/10 (No Pain)   Do you use any other substances recreationally? No        Social History     Tobacco Use    Smoking status: Former    Smokeless tobacco: Never   Vaping Use    Vaping status: Never Used   Substance Use Topics    Alcohol use: No    Drug use: No       Mammogram Screening - Mammogram every 1-2 years updated in Health Maintenance based on mutual decision making            Latest Ref Rng & Units 10/26/2016     9:00 AM 10/26/2016    12:00 AM   PAP / HPV   PAP (Historical)   NIL    HPV 16 DNA NEG Negative     HPV 18 DNA NEG Negative     Other HR HPV NEG Negative       ASCVD Risk   The 10-year ASCVD risk score (Sunday NAVARRO, et al., 2019) is: 4.7%    Values used to calculate the score:      Age: 64 years      Sex: Female      Is Non- : No      Diabetic: No      Tobacco smoker: No      Systolic Blood Pressure: 110 mmHg      Is BP treated: No      HDL Cholesterol: 56 mg/dL      Total Cholesterol: 210 mg/dL      Reviewed and updated as needed this visit by Provider  Tobacco  Allergies  Meds  Problems  Med Hx  Surg Hx  Fam Hx         Current providers sharing in care for this patient include:  Patient Care Team:  Allan Peña PA-C as PCP - General (Physician Assistant)  Allan Peña PA-C as Assigned PCP    The following health maintenance items are reviewed in Epic and correct as of today:  Health Maintenance   Topic Date Due    DEXA  Never done    MAMMO SCREENING  Never done    LUNG CANCER SCREENING  Never done    MEDICARE ANNUAL WELLNESS VISIT  11/17/2023    Pneumococcal Vaccine: 65+ Years (1 of 1 - PCV) Never done     "COVID-19 Vaccine (4 - 2024-25 season) 09/01/2024    ANNUAL REVIEW OF HM ORDERS  10/12/2024    FALL RISK ASSESSMENT  12/11/2025    DTAP/TDAP/TD IMMUNIZATION (2 - Td or Tdap) 06/23/2026    COLORECTAL CANCER SCREENING  09/02/2026    GLUCOSE  10/12/2026    LIPID  10/12/2028    ADVANCE CARE PLANNING  10/12/2028    RSV VACCINE (1 - 1-dose 75+ series) 11/17/2033    HEPATITIS C SCREENING  Completed    PHQ-2 (once per calendar year)  Completed    INFLUENZA VACCINE  Completed    ZOSTER IMMUNIZATION  Completed    HPV IMMUNIZATION  Aged Out    MENINGITIS IMMUNIZATION  Aged Out    RSV MONOCLONAL ANTIBODY  Aged Out    PAP  Discontinued         Review of Systems  Constitutional, HEENT, cardiovascular, pulmonary, GI, , musculoskeletal, neuro, skin, endocrine and psych systems are negative, except as otherwise noted.     Objective    Exam  /70   Pulse 75   Temp 97.7  F (36.5  C) (Temporal)   Resp 20   Ht 1.61 m (5' 3.39\")   Wt 67.8 kg (149 lb 8 oz)   LMP 05/12/2011 (Approximate)   SpO2 95%   BMI 26.16 kg/m     Estimated body mass index is 26.16 kg/m  as calculated from the following:    Height as of this encounter: 1.61 m (5' 3.39\").    Weight as of this encounter: 67.8 kg (149 lb 8 oz).    Physical Exam  GENERAL: alert and no distress  EYES: Eyes grossly normal to inspection, PERRL and conjunctivae and sclerae normal  HENT: ear canals and left TM normal, right TM with serous effusion, nasal mucosa is edematous bilaterally, mouth without ulcers or lesions  NECK: no adenopathy, no asymmetry, masses, or scars  RESP: lungs clear to auscultation - no rales, rhonchi or wheezes  CV: regular rate and rhythm, normal S1 S2, no S3 or S4, no murmur, click or rub, no peripheral edema  ABDOMEN: soft, nontender, no hepatosplenomegaly, no masses and bowel sounds normal  MS: no gross musculoskeletal defects noted, no edema  SKIN: no suspicious lesions or rashes  NEURO: Normal strength and tone, mentation intact and speech normal. " Gait is stable.   PSYCH: mentation appears normal, affect normal/bright        12/11/2024   Mini Cog   Clock Draw Score 2 Normal   3 Item Recall 2 objects recalled   Mini Cog Total Score 4            12/11/2024   Vision Screen   Patient wears corrective lenses (select all that apply) Worn during vision screen   Vision Screen Results Pass          Signed Electronically by: Allan Peña PA-C

## 2025-02-11 ENCOUNTER — ANCILLARY PROCEDURE (OUTPATIENT)
Dept: MAMMOGRAPHY | Facility: CLINIC | Age: 65
End: 2025-02-11
Attending: PHYSICIAN ASSISTANT
Payer: COMMERCIAL

## 2025-02-11 DIAGNOSIS — Z12.31 VISIT FOR SCREENING MAMMOGRAM: ICD-10-CM

## 2025-02-11 PROCEDURE — 77067 SCR MAMMO BI INCL CAD: CPT | Mod: TC | Performed by: RADIOLOGY

## 2025-02-11 PROCEDURE — 77063 BREAST TOMOSYNTHESIS BI: CPT | Mod: TC | Performed by: RADIOLOGY

## 2025-04-29 NOTE — PROGRESS NOTES
"  SUBJECTIVE:   July Green is a 57 year old female who presents to clinic today for the following health issues:    HPI  Joint Pain - Left knee and leg pain     Onset: ongoing for a couple of months    Description:   Location: left knee - \"whole leg down\"  Character: Sharp and \"pulling\"    Intensity: mild    Progression of Symptoms: worse    Accompanying Signs & Symptoms:  Other symptoms: none    History:   Previous similar pain: no       Precipitating factors:   Trauma or overuse: no     Alleviating factors:  Improved by: nothing  Therapies Tried and outcome: sometimes aleve, but that doesn't help  Pain gets worse at night when she is at work, \"I can't even walk\"    - Can't wear the brace, tried for a couple hours and felt worse   - Worse as the day goes on   - Ice hurts even more   - Also elevating   - Pops, clicks, locks, and feels like going to give out on her all the time       - Seen by PCP for this 3/30/18, diagnosed with OA      Symptoms are consistent with primary osteoarthritis of the left knee based on her symptoms and exam findings.   Will order an x-ray for further evaluation.   I recommend she continue with ibuprofen, rest, ice, and elevation.  Instructed to avoid quick movements and prolonged walking and standing until pain improves.  I recommend a soft knee brace for comfort and stability.   Depending on x-ray results, may send to orthopedics if severe arthritis is found.   If pain is worsening or not improving over the next month, she will me know and will send to orthopedics versus trying PT or ordering an MRI.     Please call and notify patient that there is some mild arthritis in the knee but it is not severe. If symptoms are not improving like we discussed, will send to orthopedics.         Problem list and histories reviewed & adjusted, as indicated.  Additional history: as documented      ROS:  Constitutional, HEENT, cardiovascular, pulmonary, gi and gu systems are negative, except as " otherwise noted.    OBJECTIVE:   BP 90/72 (BP Location: Right arm, Patient Position: Chair, Cuff Size: Adult Regular)  Pulse 76  Temp 98.8  F (37.1  C) (Oral)  Resp 16  Wt 151 lb (68.5 kg)  LMP 05/12/2011  SpO2 94%  BMI 25.92 kg/m2  Body mass index is 25.92 kg/(m^2).  GENERAL APPEARANCE: healthy, alert and no distress  EYES: Eyes grossly normal to inspection, PERRLA, conjunctivae and sclerae without injection or discharge, EOM intact   MS: No musculoskeletal defects are noted and gait is age appropriate without ataxia       Left knee: Decreased ROM with extension, remainder normal, tender to medial side with small effusion, rest with no edema, CMS intact, normal sensory exam, normal strength, negative anterior and posterior drawer tests, Positive Marianne's with lateralization medially       Left foot/ankle: Normal ROM, non-tender, no edema, CMS intact, normal sensory exam, normal strength       Right knee: Normal ROM, non-tender, no edema, CMS intact, normal sensory exam, normal strength       Right foot/ankle: Normal ROM, non-tender, no edema, CMS intact, normal sensory exam, normal strength  SKIN: No suspicious lesions or rashes, hydration status appears adeuqate with normal skin turgor   PSYCH: Alert and oriented x3; speech- coherent , normal rate and volume; able to articulate logical thoughts, able to abstract reason, no tangential thoughts, no hallucinations or delusions, mentation appears normal, Mood is euthymic. Affect is appropriate for this mood state and bright. Thought content is free of suicidal ideation, hallucinations, and delusions. Dress is adequate and upkept. Eye contact is good during conversation.       Diagnostic Test Results:  none     ASSESSMENT/PLAN:       ICD-10-CM    1. Primary osteoarthritis of left knee M17.12 ORTHO  REFERRAL     ibuprofen (ADVIL/MOTRIN) 800 MG tablet   2. Acute pain of left knee M25.562 ibuprofen (ADVIL/MOTRIN) 800 MG tablet       - Patient with  continued left knee pain after x-ray showed OA and conservative therapy  - Concern on exam for possible medial meniscal injury   - Discussed ortho referral indicated at this time (vs. Physical therapy and/or MRI), patient wishes to wait on MRI and see ortho first   - Referral placed   - Stop Naproxen, switch to Ibuprofen 800 mg TID PRN, reviewed use and side effects     The patient indicates understanding of these issues and agrees with the plan.    Follow up: with specialist         Anne Marie Ma PA-C  [facility]   concern for pneumonia

## (undated) DEVICE — PAD PERI W/WINGS 1580A

## (undated) DEVICE — SOL WATER INJ 2000ML BAG 07118-07

## (undated) DEVICE — SPONGE PACK VAGINAL 2"X9

## (undated) DEVICE — TUBING IRRIG TUR Y TYPE 96" LF 6543-01

## (undated) DEVICE — PACK MINOR SBA15MIFSE

## (undated) DEVICE — SOL WATER IRRIG 1000ML BOTTLE 07139-09

## (undated) DEVICE — CATH FOLEY 16FR 5ML SIL 165816

## (undated) DEVICE — DRAPE GYN/UROLOGY FLUID POUCH TUR 29455

## (undated) DEVICE — SU VICRYL 2-0 CT-2 27" J333H

## (undated) DEVICE — DECANTER TRANSFER DEVICE 2008S

## (undated) DEVICE — GLOVE PROTEXIS W/NEU-THERA 7.0  2D73TE70

## (undated) DEVICE — TUBING SUCTION 10'X3/16" N510

## (undated) DEVICE — SYR 10ML LL W/O NDL

## (undated) DEVICE — SUCTION TIP YANKAUER W/O VENT K86

## (undated) DEVICE — PREP SKIN SCRUB TRAY 4461A